# Patient Record
Sex: FEMALE | Race: BLACK OR AFRICAN AMERICAN | Employment: FULL TIME | URBAN - METROPOLITAN AREA
[De-identification: names, ages, dates, MRNs, and addresses within clinical notes are randomized per-mention and may not be internally consistent; named-entity substitution may affect disease eponyms.]

---

## 2022-12-28 ENCOUNTER — HOSPITAL ENCOUNTER (OUTPATIENT)
Age: 27
Discharge: HOME | End: 2022-12-28
Attending: OBSTETRICS & GYNECOLOGY | Admitting: OBSTETRICS & GYNECOLOGY
Payer: COMMERCIAL

## 2022-12-28 VITALS
BODY MASS INDEX: 38.51 KG/M2 | RESPIRATION RATE: 17 BRPM | TEMPERATURE: 99.4 F | WEIGHT: 191 LBS | HEIGHT: 59 IN | HEART RATE: 91 BPM | DIASTOLIC BLOOD PRESSURE: 93 MMHG | SYSTOLIC BLOOD PRESSURE: 131 MMHG

## 2022-12-28 PROBLEM — O16.9 HYPERTENSION AFFECTING PREGNANCY: Status: ACTIVE | Noted: 2022-12-28

## 2022-12-28 LAB
ALBUMIN SERPL-MCNC: 2.6 G/DL (ref 3.4–5)
ALBUMIN/GLOB SERPL: 0.7 {RATIO} (ref 0.8–1.7)
ALP SERPL-CCNC: 117 U/L (ref 45–117)
ALT SERPL-CCNC: 13 U/L (ref 13–56)
ANION GAP SERPL CALC-SCNC: 9 MMOL/L (ref 3–18)
AST SERPL-CCNC: 13 U/L (ref 10–38)
BILIRUB SERPL-MCNC: 0.4 MG/DL (ref 0.2–1)
BUN SERPL-MCNC: 7 MG/DL (ref 7–18)
BUN/CREAT SERPL: 10 (ref 12–20)
CALCIUM SERPL-MCNC: 8.6 MG/DL (ref 8.5–10.1)
CHLORIDE SERPL-SCNC: 105 MMOL/L (ref 100–111)
CO2 SERPL-SCNC: 23 MMOL/L (ref 21–32)
CREAT SERPL-MCNC: 0.73 MG/DL (ref 0.6–1.3)
CREAT UR-MCNC: 138 MG/DL (ref 30–125)
ERYTHROCYTE [DISTWIDTH] IN BLOOD BY AUTOMATED COUNT: 12.4 % (ref 11.6–14.5)
GLOBULIN SER CALC-MCNC: 3.8 G/DL (ref 2–4)
GLUCOSE SERPL-MCNC: 138 MG/DL (ref 74–99)
HCT VFR BLD AUTO: 32.9 % (ref 35–45)
HGB BLD-MCNC: 11.3 G/DL (ref 12–16)
MCH RBC QN AUTO: 29.7 PG (ref 24–34)
MCHC RBC AUTO-ENTMCNC: 34.3 G/DL (ref 31–37)
MCV RBC AUTO: 86.4 FL (ref 78–100)
NRBC # BLD: 0 K/UL (ref 0–0.01)
NRBC BLD-RTO: 0 PER 100 WBC
PLATELET # BLD AUTO: 234 K/UL (ref 135–420)
PMV BLD AUTO: 8.4 FL (ref 9.2–11.8)
POTASSIUM SERPL-SCNC: 3.4 MMOL/L (ref 3.5–5.5)
PROT SERPL-MCNC: 6.4 G/DL (ref 6.4–8.2)
PROT UR-MCNC: 21 MG/DL
PROT/CREAT UR-RTO: 0.2
RBC # BLD AUTO: 3.81 M/UL (ref 4.2–5.3)
SODIUM SERPL-SCNC: 137 MMOL/L (ref 136–145)
URATE SERPL-MCNC: 4.3 MG/DL (ref 2.6–7.2)
WBC # BLD AUTO: 13.9 K/UL (ref 4.6–13.2)

## 2022-12-28 PROCEDURE — 59025 FETAL NON-STRESS TEST: CPT

## 2022-12-28 PROCEDURE — 99283 EMERGENCY DEPT VISIT LOW MDM: CPT

## 2022-12-28 PROCEDURE — 80053 COMPREHEN METABOLIC PANEL: CPT

## 2022-12-28 PROCEDURE — 84550 ASSAY OF BLOOD/URIC ACID: CPT

## 2022-12-28 PROCEDURE — 85027 COMPLETE CBC AUTOMATED: CPT

## 2022-12-28 PROCEDURE — 84156 ASSAY OF PROTEIN URINE: CPT

## 2022-12-28 PROCEDURE — 74011250637 HC RX REV CODE- 250/637

## 2022-12-28 RX ORDER — LABETALOL 200 MG/1
200 TABLET, FILM COATED ORAL 2 TIMES DAILY
Qty: 60 TABLET | Refills: 2 | Status: SHIPPED | OUTPATIENT
Start: 2022-12-28

## 2022-12-28 RX ORDER — LABETALOL 100 MG/1
100 TABLET, FILM COATED ORAL EVERY 12 HOURS
Status: DISCONTINUED | OUTPATIENT
Start: 2022-12-28 | End: 2022-12-28 | Stop reason: HOSPADM

## 2022-12-28 RX ORDER — LABETALOL 100 MG/1
100 TABLET, FILM COATED ORAL
Status: COMPLETED | OUTPATIENT
Start: 2022-12-28 | End: 2022-12-28

## 2022-12-28 RX ORDER — ONDANSETRON HYDROCHLORIDE 8 MG/1
8 TABLET, FILM COATED ORAL
COMMUNITY

## 2022-12-28 RX ADMIN — LABETALOL HYDROCHLORIDE 100 MG: 100 TABLET, FILM COATED ORAL at 12:41

## 2022-12-28 RX ADMIN — LABETALOL HYDROCHLORIDE 100 MG: 100 TABLET, FILM COATED ORAL at 11:00

## 2022-12-28 NOTE — PROGRESS NOTES
26  @ 29.1 weeks arrived from office with elevated BP's. Orders received for PIH workup. To bathroom to void, ambulated to bed and connected to EFM. [address] [facility] Jakob LIMON at bedside discussing POC with pt. Pt denies any questions at this time. 1230 Reviewed BP's with Jakob LIMON, will increase dose of labetalol to 200 mg BID.    1243 Discharge instructions given, pt verbalized understanding.

## 2022-12-31 NOTE — PROGRESS NOTES
Pt to labor and delivery from office with elevated blood pressures. Pre - eclampsia labs done and all WNL. GHTN diagnosis confirmed. Started on Labetalol 200 mg PO BID. Pt to have twice weekly NST in office and growth US next week then every 4 weeks. Plan for delivery at 37 weeks d/t GHTN on medications. Reviewed plan of care with pt and all questions answered. Discharge home.       Dayana Guerrero

## 2023-01-02 ENCOUNTER — HOSPITAL ENCOUNTER (EMERGENCY)
Age: 28
Discharge: HOME | End: 2023-01-02
Attending: OBSTETRICS & GYNECOLOGY | Admitting: OBSTETRICS & GYNECOLOGY

## 2023-01-02 VITALS
WEIGHT: 191 LBS | DIASTOLIC BLOOD PRESSURE: 90 MMHG | BODY MASS INDEX: 38.51 KG/M2 | HEART RATE: 91 BPM | SYSTOLIC BLOOD PRESSURE: 134 MMHG | HEIGHT: 59 IN | TEMPERATURE: 99.3 F

## 2023-01-04 ENCOUNTER — HOSPITAL ENCOUNTER (EMERGENCY)
Age: 28
Discharge: HOME | End: 2023-01-04
Attending: OBSTETRICS & GYNECOLOGY | Admitting: OBSTETRICS & GYNECOLOGY
Payer: MEDICAID

## 2023-01-04 VITALS
RESPIRATION RATE: 17 BRPM | HEIGHT: 59 IN | HEART RATE: 123 BPM | BODY MASS INDEX: 38.51 KG/M2 | WEIGHT: 191 LBS | DIASTOLIC BLOOD PRESSURE: 89 MMHG | TEMPERATURE: 99.4 F | SYSTOLIC BLOOD PRESSURE: 130 MMHG

## 2023-01-04 LAB
COVID-19 RAPID TEST, COVR: NOT DETECTED
FLUAV AG NPH QL IA: POSITIVE
FLUBV AG NOSE QL IA: NEGATIVE
SOURCE, COVRS: NORMAL

## 2023-01-04 PROCEDURE — 59025 FETAL NON-STRESS TEST: CPT

## 2023-01-04 PROCEDURE — 87804 INFLUENZA ASSAY W/OPTIC: CPT

## 2023-01-04 PROCEDURE — 87635 SARS-COV-2 COVID-19 AMP PRB: CPT

## 2023-01-04 PROCEDURE — 96365 THER/PROPH/DIAG IV INF INIT: CPT

## 2023-01-04 NOTE — PROGRESS NOTES
RN at bedside, patient consumed two 4oz orange juices. Patient requesting to use restroom, RN unplugged EFM and TOCO, patient up to restroom.

## 2023-01-04 NOTE — PROGRESS NOTES
1420- RN and Sav Reid CNM reviewed fetal tracing, plan to discharge patient to home. 1425-RN At bedside fluid bolus completed IV removed and band-aide applied    1426-EFM and TOCO removed patient getting dressed.

## 2023-01-04 NOTE — PROGRESS NOTES
Presented to L&D reporting fever x3d, congestion, and malaise. Reports good FM, denies VB, LOF, contractions. Influenza and COVID swab obtained. Flu positive. Patient declines IV fluids. Cheyanne HuttonPATEL, CNM  2023               [facility] [place]   PATIENT INFORMATION      CSN:  [id number]                             Patient Name: Juliocesar Echols Pt ID: [id number]   Address: [place] [address] [place] [address]   Sex: Female  Mar Stat: SINGLE   : 1995 Age:   24 yrs   Home Phone: [phone] Mobile Phone:   [phone]   Work Phone:   Employer:    [employer]   Race: BLACK/  Yarsani:   Orthodox    ADMISSION INFORMATION   Adm Date: 2023 Adm Time: 1118   Patient Class: Ld triage outpatient Service: Obstetrics   Adm Source: Non-health care facility* Adm Type: URGENT   Admitting Prov: Paul Garcia Attending Prov: Paul Garcia   Unit: [facility] 2east Ld Triage Room/Bed: [facility]/    Adm Diagnosis: pregnancy                                                     Disch Date:   Disch Time:     GUARANTOR INFORMATION   Name:  Juliocesar Echols Phone: [phone] Rel :  Self   Address: [place] [id number] [address], [address]   Name:     Rel:  Agustin Méndez 117 Ph:  Work Ph:  Mobile Ph: [facility] [phone]      [address] Ph.  Mobile phone [3]   COVERAGE INFORMATION   Primary Ins:    [facility] Insured Name: Brigid Alcantar, [address] [place] [address] Name: [place] [address] [place] [address],[address] Address: [place] [address] [address] Rel to Patient: Self      Sex: Female      Policy #:  [id number]    Group # [id number] Group Name:       Auth #: Auth number: N/A Ins Phone:     Secondary Ins: [address] Name: [facility] Address: [address] [place] [address] Rel to Patient: Child      Sex: Female      Policy #: [id number]    Group #:   Group Name:     Gregor King #: N/A Ins Phone: Accident Date:    Accident Type:     PROVIDER INFORMATION   PCP:         None PCP Phone:  None   Referring Prov:   No ref.  provider found Referring Phone:  N/A   Advanced Directive:  Not Received Language:   ENGLISH

## 2023-01-04 NOTE — PROGRESS NOTES
RN at bedside with discharge paperwork. Patient educated on reasons to return to hospital and verbalized understanding. At this time patient has no further concerns or questions.  Patient ambulating alone out of hospital.

## 2023-01-04 NOTE — PROGRESS NOTES
RN at bedside   Per CNM insert IV and run 1L fluid bolus. IV insert, fluid bolus started, patient resting comfortably in bed.  No questions or concerns

## 2023-01-09 ENCOUNTER — HOSPITAL ENCOUNTER (OUTPATIENT)
Age: 28
Discharge: HOME | End: 2023-01-09
Attending: OBSTETRICS & GYNECOLOGY | Admitting: OBSTETRICS & GYNECOLOGY
Payer: COMMERCIAL

## 2023-01-09 VITALS
BODY MASS INDEX: 39.31 KG/M2 | SYSTOLIC BLOOD PRESSURE: 138 MMHG | HEART RATE: 76 BPM | WEIGHT: 195 LBS | RESPIRATION RATE: 16 BRPM | DIASTOLIC BLOOD PRESSURE: 86 MMHG | HEIGHT: 59 IN | TEMPERATURE: 98.9 F

## 2023-01-09 PROBLEM — Z34.90 PREGNANCY: Status: ACTIVE | Noted: 2023-01-09

## 2023-01-09 LAB
APPEARANCE UR: CLEAR
BILIRUB UR QL: NEGATIVE
COLOR UR: YELLOW
GLUCOSE UR QL STRIP.AUTO: NEGATIVE MG/DL
KETONES UR-MCNC: NEGATIVE MG/DL
LEUKOCYTE ESTERASE UR QL STRIP: NEGATIVE
NITRITE UR QL: NEGATIVE
PH UR: 6 (ref 5–9)
PROT UR QL: NEGATIVE MG/DL
RBC # UR STRIP: NEGATIVE
SERVICE CMNT-IMP: NORMAL
SP GR UR: 1.01 (ref 1–1.02)
UROBILINOGEN UR QL: 0.2 EU/DL (ref 0.2–1)

## 2023-01-09 PROCEDURE — 59025 FETAL NON-STRESS TEST: CPT

## 2023-01-09 PROCEDURE — 81003 URINALYSIS AUTO W/O SCOPE: CPT

## 2023-01-09 NOTE — PROGRESS NOTES
1350- Patient presents to labor and delivery  30weeks 6days for complaints of decreased fetal movement since Saturday. Patient was seen today in the office, states she had elevated BP in office. Patient is gestational HTN on labetalol BID. Triage assessment performed, TOCO and ultrasound placed on abdomin, abdomin soft to palpate. PO fluids given to patient. [id number]- Patient feels movement    1435- M.  Margo Zafar made aware of patient, orders given     [id number] 34-  Discharge instructions given to patient, patient verbalized understanding

## 2023-01-09 NOTE — DISCHARGE INSTRUCTIONS
Prenatal Discharge Instructions  Follow up appointment: Ensure to keep your scheduled appointment with your OB     Diet: As toleated, ensure to drink plenty of fluids especially water for hydration     Activity: As tolerated, elevate feet and legs while sitting to help keep swelling to a minimum     Medications:  As prescribed     Call your physician or return to Labor and Delivery if any of the following symptoms occur:  Signs of labor (contractions every 5-10 minutes for 1 hour)  Vaginal bleeding  Rupture of amniotic membranes (water breaks)  Fever  Decreased fetal movement (less than 5-10 movements in 1 hour)

## 2023-01-18 ENCOUNTER — HOSPITAL ENCOUNTER (OUTPATIENT)
Age: 28
Discharge: HOME | End: 2023-01-19
Attending: OBSTETRICS & GYNECOLOGY | Admitting: STUDENT IN AN ORGANIZED HEALTH CARE EDUCATION/TRAINING PROGRAM
Payer: MEDICAID

## 2023-01-18 VITALS
WEIGHT: 199 LBS | SYSTOLIC BLOOD PRESSURE: 139 MMHG | RESPIRATION RATE: 18 BRPM | BODY MASS INDEX: 40.12 KG/M2 | HEART RATE: 88 BPM | DIASTOLIC BLOOD PRESSURE: 87 MMHG | HEIGHT: 59 IN

## 2023-01-18 PROCEDURE — 99282 EMERGENCY DEPT VISIT SF MDM: CPT

## 2023-01-19 NOTE — PROGRESS NOTES
2100: Pt arrived to unit for c/o decreased fetal movement. 2107: Pt placed on EFM. Reassuring FHR noted. 2145: Strip reviewed pt is feeling movement. She also has a prenatal appointment tomorrow morning in the office at 0830  2248: removed from EFM. 2215: Verbal and written discharge instructions provided pt verbalizes understanding and discharged from unit at this time.

## 2023-01-20 NOTE — PROGRESS NOTES
Pt to L&D c/o decreased fetal movement at 32/27 weeks gestation. Denies ctx's, VB or LOF. NST RGA. Pt aware of movement after NST complete. Discharge home with precautions. Has appt with office tomorrow for NST. Reza Robert CNM   DX:  Decreased Fetal Movement. [facility] [place]   PATIENT INFORMATION      CSN:  [id number]                             Patient Name: Giuliana Hernandez Pt ID: [id number]   Address: [place] [id number] [facility] [place]   [id number]   Sex: Female  Mar Stat: SINGLE   : 1995 Age:   24 yrs   Home Phone: [phone] Mobile Phone:   [phone]   Work Phone:   Employer: [employer]   Race: BLACK/  Mandaen:   Gnosticism    ADMISSION INFORMATION   Adm Date: 2023 Adm Time:    Patient Class:  Outpatient Service: Obstetrics   Adm Source: Non-health care facility* Adm Type: URGENT   Admitting Prov: Pablo Monteiro Attending Prov: Amarjit Ordonez   Unit: [facility] 2east Ld Triage Room/Bed: [place] [address]    Adm Diagnosis:                                                      Disch Date: 2023 Disch Time: [address] INFORMATION   Name:  Giuliana Hernandez Phone: [phone] Rel :  Self   Address: [place] [address] [address] [facility] [address] [place] [address], [address]   Name:     Rel:  [place] [address] Ph:  Work Ph:  Mobile Ph: [facility] [phone]      [address] Ph.  Mobile phone [3]   COVERAGE INFORMATION   Primary Ins:    [facility] Insured Name: Roy Phoenix, [address] [facility] Name: [address] Address: [address] Rel to Patient: Self      Sex: Female      Policy #:  [id number]    Group # [id number] Group Name:       Satish Michelle #: Auth number: N/A Ins Phone:     Secondary Ins: Tresa Castillo Insured Name: Giuliana Hernandez   Claim Address: [address] [place] [address] Rel to Patient: Self      Sex: Female      Policy #: [id number]    Group #: [id number] Group Name:     Robert Garibay #: N/A Ins Phone:     Accident Date:    Accident Type:     PROVIDER INFORMATION   PCP:         None PCP Phone:  None   Referring Prov:   No ref.  provider found Referring Phone:  N/A   Advanced Directive:  Not Received Language:   ENGLISH

## 2023-01-26 ENCOUNTER — HOSPITAL ENCOUNTER (OUTPATIENT)
Age: 28
Discharge: HOME | End: 2023-01-26
Attending: OBSTETRICS & GYNECOLOGY | Admitting: OBSTETRICS & GYNECOLOGY
Payer: COMMERCIAL

## 2023-01-26 VITALS
DIASTOLIC BLOOD PRESSURE: 77 MMHG | SYSTOLIC BLOOD PRESSURE: 137 MMHG | BODY MASS INDEX: 40.92 KG/M2 | WEIGHT: 203 LBS | HEIGHT: 59 IN | TEMPERATURE: 98.4 F | HEART RATE: 82 BPM

## 2023-01-26 PROBLEM — O28.8 NST (NON-STRESS TEST) NONREACTIVE: Status: ACTIVE | Noted: 2023-01-26

## 2023-01-26 LAB
ALBUMIN SERPL-MCNC: 2.5 G/DL (ref 3.4–5)
ALBUMIN/GLOB SERPL: 0.6 (ref 0.8–1.7)
ALP SERPL-CCNC: 137 U/L (ref 45–117)
ALT SERPL-CCNC: 19 U/L (ref 13–56)
ANION GAP SERPL CALC-SCNC: 8 MMOL/L (ref 3–18)
AST SERPL-CCNC: 24 U/L (ref 10–38)
BILIRUB SERPL-MCNC: 0.2 MG/DL (ref 0.2–1)
BUN SERPL-MCNC: 9 MG/DL (ref 7–18)
BUN/CREAT SERPL: 14 (ref 12–20)
CALCIUM SERPL-MCNC: 9 MG/DL (ref 8.5–10.1)
CHLORIDE SERPL-SCNC: 103 MMOL/L (ref 100–111)
CO2 SERPL-SCNC: 26 MMOL/L (ref 21–32)
CREAT SERPL-MCNC: 0.65 MG/DL (ref 0.6–1.3)
CREAT UR-MCNC: 64 MG/DL (ref 30–125)
ERYTHROCYTE [DISTWIDTH] IN BLOOD BY AUTOMATED COUNT: 13.2 % (ref 11.6–14.5)
GLOBULIN SER CALC-MCNC: 4.1 G/DL (ref 2–4)
GLUCOSE SERPL-MCNC: 89 MG/DL (ref 74–99)
HCT VFR BLD AUTO: 33.4 % (ref 35–45)
HGB BLD-MCNC: 11.7 G/DL (ref 12–16)
LDH SERPL L TO P-CCNC: 230 U/L (ref 81–234)
MCH RBC QN AUTO: 30.5 PG (ref 24–34)
MCHC RBC AUTO-ENTMCNC: 35 G/DL (ref 31–37)
MCV RBC AUTO: 87.2 FL (ref 78–100)
NRBC # BLD: 0 K/UL (ref 0–0.01)
NRBC BLD-RTO: 0 PER 100 WBC
PLATELET # BLD AUTO: 235 K/UL (ref 135–420)
PMV BLD AUTO: 9.5 FL (ref 9.2–11.8)
POTASSIUM SERPL-SCNC: 3.8 MMOL/L (ref 3.5–5.5)
PROT SERPL-MCNC: 6.6 G/DL (ref 6.4–8.2)
PROT UR-MCNC: 13 MG/DL
PROT/CREAT UR-RTO: 0.2
RBC # BLD AUTO: 3.83 M/UL (ref 4.2–5.3)
SODIUM SERPL-SCNC: 137 MMOL/L (ref 136–145)
URATE SERPL-MCNC: 5.1 MG/DL (ref 2.6–7.2)
WBC # BLD AUTO: 13.3 K/UL (ref 4.6–13.2)

## 2023-01-26 PROCEDURE — 80053 COMPREHEN METABOLIC PANEL: CPT

## 2023-01-26 PROCEDURE — 84156 ASSAY OF PROTEIN URINE: CPT

## 2023-01-26 PROCEDURE — 99283 EMERGENCY DEPT VISIT LOW MDM: CPT

## 2023-01-26 PROCEDURE — 74011250636 HC RX REV CODE- 250/636

## 2023-01-26 PROCEDURE — 74011250637 HC RX REV CODE- 250/637

## 2023-01-26 PROCEDURE — 84550 ASSAY OF BLOOD/URIC ACID: CPT

## 2023-01-26 PROCEDURE — 85027 COMPLETE CBC AUTOMATED: CPT

## 2023-01-26 PROCEDURE — 83615 LACTATE (LD) (LDH) ENZYME: CPT

## 2023-01-26 PROCEDURE — 96372 THER/PROPH/DIAG INJ SC/IM: CPT

## 2023-01-26 PROCEDURE — 59025 FETAL NON-STRESS TEST: CPT

## 2023-01-26 RX ORDER — LABETALOL 200 MG/1
200 TABLET, FILM COATED ORAL
Status: COMPLETED | OUTPATIENT
Start: 2023-01-26 | End: 2023-01-26

## 2023-01-26 RX ORDER — BETAMETHASONE SODIUM PHOSPHATE AND BETAMETHASONE ACETATE 3; 3 MG/ML; MG/ML
12 INJECTION, SUSPENSION INTRA-ARTICULAR; INTRALESIONAL; INTRAMUSCULAR; SOFT TISSUE EVERY 24 HOURS
Status: DISCONTINUED | OUTPATIENT
Start: 2023-01-26 | End: 2023-01-26 | Stop reason: HOSPADM

## 2023-01-26 RX ADMIN — LABETALOL HYDROCHLORIDE 200 MG: 200 TABLET, FILM COATED ORAL at 13:47

## 2023-01-26 RX ADMIN — BETAMETHASONE SODIUM PHOSPHATE AND BETAMETHASONE ACETATE 12 MG: 3; 3 INJECTION, SUSPENSION INTRA-ARTICULAR; INTRALESIONAL; INTRAMUSCULAR at 15:40

## 2023-01-26 RX ADMIN — SODIUM CHLORIDE, POTASSIUM CHLORIDE, SODIUM LACTATE AND CALCIUM CHLORIDE 1000 ML: 600; 310; 30; 20 INJECTION, SOLUTION INTRAVENOUS at 13:16

## 2023-01-26 NOTE — PROGRESS NOTES
1545 Patient discharged in stable condition. Discharged education reviewed and packet given to patient. Patient verbalizes understanding of discharge instructions. E-sign completed. Armbands removed and given to patient. No further needs or questions reported at this time.

## 2023-01-26 NOTE — DISCHARGE INSTRUCTIONS
Pregnancy Precautions: Care Instructions  Your Care Instructions     There is no sure way to prevent labor before your due date ( labor) or to prevent most other pregnancy problems. But there are things you can do to increase your chances of a healthy pregnancy. Go to your appointments, follow your doctor's advice, and take good care of yourself. Eat well, and exercise (if your doctor agrees). And make sure to drink plenty of water. Follow-up care is a key part of your treatment and safety. Be sure to make and go to all appointments, and call your doctor if you are having problems. It's also a good idea to know your test results and keep a list of the medicines you take. How can you care for yourself at home? Make sure you go to your prenatal appointments. At each visit, your doctor will check your blood pressure. Your doctor will also check to see if you have protein in your urine. High blood pressure and protein in urine are signs of preeclampsia. This condition can be dangerous for you and your baby. Drink plenty of fluids. Dehydration can cause contractions. If you have kidney, heart, or liver disease and have to limit fluids, talk with your doctor before you increase the amount of fluids you drink. Tell your doctor right away if you notice any symptoms of an infection, such as:  Burning when you urinate. A foul-smelling discharge from your vagina. Vaginal itching. Unexplained fever. Unusual pain or soreness in your uterus or lower belly. Eat a balanced diet. Include plenty of foods that are high in calcium and iron. Foods high in calcium include milk, cheese, yogurt, almonds, and broccoli. Foods high in iron include red meat, shellfish, poultry, eggs, beans, raisins, whole-grain bread, and leafy green vegetables. Do not smoke. If you need help quitting, talk to your doctor about stop-smoking programs and medicines. These can increase your chances of quitting for good.   Do not drink alcohol or use marijuana or illegal drugs. Follow your doctor's directions about activity. Your doctor will let you know how much, if any, exercise you can do. Ask your doctor if you can have sex. If you are at risk for early labor, your doctor may ask you to not have sex. Take care to prevent falls. During pregnancy, your joints are loose, and your balance is off. Sports such as bicycling, skiing, or in-line skating can increase your risk of falling. And don't ride horses or motorcycles, dive, water ski, scuba dive, or parachute jump while you are pregnant. Avoid things that can make your body too hot and may be harmful to your baby, such as a hot tub or sauna. Or talk with your doctor before doing anything that raises your body temperature. Your doctor can tell you if it's safe. Do not take any over-the-counter or herbal medicines or supplements without talking to your doctor or pharmacist first.  When should you call for help? Call 911  anytime you think you may need emergency care. For example, call if:    You passed out (lost consciousness). You have a seizure. You have severe vaginal bleeding. You have severe pain in your belly or pelvis. You have had fluid gushing or leaking from your vagina and you know or think the umbilical cord is bulging into your vagina. If this happens, immediately get down on your knees so your rear end (buttocks) is higher than your head. This will decrease the pressure on the cord until help arrives. Call your doctor now or seek immediate medical care if:    You have signs of preeclampsia, such as:  Sudden swelling of your face, hands, or feet. New vision problems (such as dimness, blurring, or seeing spots). A severe headache. You have any vaginal bleeding. You have belly pain or cramping. You have a fever. You have had regular contractions (with or without pain) for an hour.  This means that you have 8 or more within 1 hour or 4 or more in 20 minutes after you change your position and drink fluids. You have a sudden release of fluid from your vagina. You have low back pain or pelvic pressure that does not go away. You notice that your baby has stopped moving or is moving much less than normal.   Watch closely for changes in your health, and be sure to contact your doctor if you have any problems. Where can you learn more? Go to http://www.madrigal.com/  Enter Y951 in the search box to learn more about \"Pregnancy Precautions: Care Instructions. \"  Current as of: February 23, 2022               Content Version: 13.4  © 3479-8363 [employer]. Care instructions adapted under license by [employer] (which disclaims liability or warranty for this information). If you have questions about a medical condition or this instruction, always ask your healthcare professional. [address] any warranty or liability for your use of this information. Weeks 32 to 34 of Your Pregnancy: Care Instructions  Decide whether you want to bank or donate your baby's umbilical cord blood. If you want to save this blood, you have to arrange for it ahead of time. Decide about circumcision. Personal, Buddhism, or cultural beliefs may play a role in your decision. You get to decide what you want for your baby. Learn how to ease hemorrhoids. Get more liquids, fruits, vegetables, and fiber in your diet. Avoid sitting for too long. Clean yourself with moist toilet paper. Or try witch hazel pads. Try ice packs or warm sitz baths for discomfort. Use hydrocortisone cream for pain or itching. Ask your doctor about stool softeners. Consider the benefits of breastfeeding. It reduces your baby's risk of sudden infant death syndrome (SIDS).  babies are less likely to get certain infections. And they're less likely to be obese or get diabetes later in life.   It can lower your risk of breast and ovarian cancers and osteoporosis. It saves you money. Follow-up care is a key part of your treatment and safety. Be sure to make and go to all appointments, and call your doctor if you are having problems. It's also a good idea to know your test results and keep a list of the medicines you take. Where can you learn more? Go to http://www.madrigal.com/  Enter X711 in the search box to learn more about \"Weeks 32 to 34 of Your Pregnancy: Care Instructions. \"  Current as of: February 23, 2022               Content Version: 13.4  © 3107-2130 [employer]. Care instructions adapted under license by [employer] (which disclaims liability or warranty for this information). If you have questions about a medical condition or this instruction, always ask your healthcare professional. [address] any warranty or liability for your use of this information.

## 2023-01-27 ENCOUNTER — HOSPITAL ENCOUNTER (OUTPATIENT)
Age: 28
Discharge: HOME | End: 2023-01-27
Attending: OBSTETRICS & GYNECOLOGY | Admitting: STUDENT IN AN ORGANIZED HEALTH CARE EDUCATION/TRAINING PROGRAM
Payer: COMMERCIAL

## 2023-01-27 VITALS
HEIGHT: 59 IN | TEMPERATURE: 98.2 F | OXYGEN SATURATION: 100 % | SYSTOLIC BLOOD PRESSURE: 137 MMHG | WEIGHT: 203 LBS | DIASTOLIC BLOOD PRESSURE: 88 MMHG | HEART RATE: 88 BPM | BODY MASS INDEX: 40.92 KG/M2 | RESPIRATION RATE: 17 BRPM

## 2023-01-27 PROBLEM — Z3A.33 33 WEEKS GESTATION OF PREGNANCY: Status: ACTIVE | Noted: 2023-01-27

## 2023-01-27 PROCEDURE — 59025 FETAL NON-STRESS TEST: CPT

## 2023-01-27 PROCEDURE — 99281 EMR DPT VST MAYX REQ PHY/QHP: CPT

## 2023-01-27 PROCEDURE — 74011250636 HC RX REV CODE- 250/636: Performed by: MIDWIFE

## 2023-01-27 PROCEDURE — 96372 THER/PROPH/DIAG INJ SC/IM: CPT

## 2023-01-27 RX ORDER — BETAMETHASONE SODIUM PHOSPHATE AND BETAMETHASONE ACETATE 3; 3 MG/ML; MG/ML
12 INJECTION, SUSPENSION INTRA-ARTICULAR; INTRALESIONAL; INTRAMUSCULAR; SOFT TISSUE EVERY 24 HOURS
Status: COMPLETED | OUTPATIENT
Start: 2023-01-27 | End: 2023-01-27

## 2023-01-27 RX ADMIN — BETAMETHASONE SODIUM PHOSPHATE AND BETAMETHASONE ACETATE 12 MG: 3; 3 INJECTION, SUSPENSION INTRA-ARTICULAR; INTRALESIONAL; INTRAMUSCULAR at 16:15

## 2023-01-27 NOTE — DISCHARGE INSTRUCTIONS
Pregnancy Precautions: Care Instructions  Your Care Instructions     There is no sure way to prevent labor before your due date ( labor) or to prevent most other pregnancy problems. But there are things you can do to increase your chances of a healthy pregnancy. Go to your appointments, follow your doctor's advice, and take good care of yourself. Eat well, and exercise (if your doctor agrees). And make sure to drink plenty of water. Follow-up care is a key part of your treatment and safety. Be sure to make and go to all appointments, and call your doctor if you are having problems. It's also a good idea to know your test results and keep a list of the medicines you take. How can you care for yourself at home? Make sure you go to your prenatal appointments. At each visit, your doctor will check your blood pressure. Your doctor will also check to see if you have protein in your urine. High blood pressure and protein in urine are signs of preeclampsia. This condition can be dangerous for you and your baby. Drink plenty of fluids. Dehydration can cause contractions. If you have kidney, heart, or liver disease and have to limit fluids, talk with your doctor before you increase the amount of fluids you drink. Tell your doctor right away if you notice any symptoms of an infection, such as:  Burning when you urinate. A foul-smelling discharge from your vagina. Vaginal itching. Unexplained fever. Unusual pain or soreness in your uterus or lower belly. Eat a balanced diet. Include plenty of foods that are high in calcium and iron. Foods high in calcium include milk, cheese, yogurt, almonds, and broccoli. Foods high in iron include red meat, shellfish, poultry, eggs, beans, raisins, whole-grain bread, and leafy green vegetables. Do not smoke. If you need help quitting, talk to your doctor about stop-smoking programs and medicines. These can increase your chances of quitting for good.   Do not drink alcohol or use marijuana or illegal drugs. Follow your doctor's directions about activity. Your doctor will let you know how much, if any, exercise you can do. Ask your doctor if you can have sex. If you are at risk for early labor, your doctor may ask you to not have sex. Take care to prevent falls. During pregnancy, your joints are loose, and your balance is off. Sports such as bicycling, skiing, or in-line skating can increase your risk of falling. And don't ride horses or motorcycles, dive, water ski, scuba dive, or parachute jump while you are pregnant. Avoid things that can make your body too hot and may be harmful to your baby, such as a hot tub or sauna. Or talk with your doctor before doing anything that raises your body temperature. Your doctor can tell you if it's safe. Do not take any over-the-counter or herbal medicines or supplements without talking to your doctor or pharmacist first.  When should you call for help? Call 911  anytime you think you may need emergency care. For example, call if:    You passed out (lost consciousness). You have a seizure. You have severe vaginal bleeding. You have severe pain in your belly or pelvis. You have had fluid gushing or leaking from your vagina and you know or think the umbilical cord is bulging into your vagina. If this happens, immediately get down on your knees so your rear end (buttocks) is higher than your head. This will decrease the pressure on the cord until help arrives. Call your doctor now or seek immediate medical care if:    You have signs of preeclampsia, such as:  Sudden swelling of your face, hands, or feet. New vision problems (such as dimness, blurring, or seeing spots). A severe headache. You have any vaginal bleeding. You have belly pain or cramping. You have a fever. You have had regular contractions (with or without pain) for an hour.  This means that you have 8 or more within 1 hour or 4 or more in 20 minutes after you change your position and drink fluids. You have a sudden release of fluid from your vagina. You have low back pain or pelvic pressure that does not go away. You notice that your baby has stopped moving or is moving much less than normal.   Watch closely for changes in your health, and be sure to contact your doctor if you have any problems. Where can you learn more? Go to http://www.madrigal.com/  Enter Y951 in the search box to learn more about \"Pregnancy Precautions: Care Instructions. \"  Current as of: February 23, 2022               Content Version: 13.4  © 2736-7813 [employer]. Care instructions adapted under license by [employer] (which disclaims liability or warranty for this information). If you have questions about a medical condition or this instruction, always ask your healthcare professional. [address] any warranty or liability for your use of this information. Weeks 32 to 34 of Your Pregnancy: Care Instructions  Decide whether you want to bank or donate your baby's umbilical cord blood. If you want to save this blood, you have to arrange for it ahead of time. Decide about circumcision. Personal, Voodoo, or cultural beliefs may play a role in your decision. You get to decide what you want for your baby. Learn how to ease hemorrhoids. Get more liquids, fruits, vegetables, and fiber in your diet. Avoid sitting for too long. Clean yourself with moist toilet paper. Or try witch hazel pads. Try ice packs or warm sitz baths for discomfort. Use hydrocortisone cream for pain or itching. Ask your doctor about stool softeners. Consider the benefits of breastfeeding. It reduces your baby's risk of sudden infant death syndrome (SIDS).  babies are less likely to get certain infections. And they're less likely to be obese or get diabetes later in life.   It can lower your risk of breast and ovarian cancers and osteoporosis. It saves you money. Follow-up care is a key part of your treatment and safety. Be sure to make and go to all appointments, and call your doctor if you are having problems. It's also a good idea to know your test results and keep a list of the medicines you take. Where can you learn more? Go to http://www.madrigal.com/  Enter X711 in the search box to learn more about \"Weeks 32 to 34 of Your Pregnancy: Care Instructions. \"  Current as of: February 23, 2022               Content Version: 13.4  © 7183-4705 [employer]. Care instructions adapted under license by [employer] (which disclaims liability or warranty for this information). If you have questions about a medical condition or this instruction, always ask your healthcare professional. [address] any warranty or liability for your use of this information.

## 2023-01-27 NOTE — ROUTINE PROCESS
[address] Pt ambulated to the unit for NST and second dose of steroid shot. Pt denies headache, blurry vision, chest pain or swelling. Pt taken to overflow triage, oriented to the room and call light. 1702 Pt taken to Triage 3, oriented to the room and call light. US and TOCO adjusted. Call bell within reach. 1745 Patient discharged stable condition. Discharged education reviewed and packet given to patient. Patient verbalizes understanding of discharge instructions. E-sign completed. Armbands removed and given to patient. No further needs or questions reported at this time. 1755 Pt ambulating off the unit.

## 2023-01-27 NOTE — PROGRESS NOTES
Pt sent from office for non reactive NST at 33 3/7 weeks gestation with GHTN on labetalol 200 mg BID. NST performed and initially NR. BPP done 8/8 (LIUDMILA 9.83) MVP 3.34. IV hydration then NST - RGA. Pre e labs done , WNL, PCR 0.2, ALT/AST WNL. BP elevated during visit 150-160/100's. Increased PO labetalol to 200 TID, dose given in L&D and BP came down to 120-130/70's. Denies HA, epigastric pain, visual disturbances or edema. Betamethasone x 1 given. Pt to return in 24 hours for BP check, NST and 2nd betamethasone.   Consulted with Dr. Wesley Kennedy and agrees with Landon 18 JOSE   January 26, 2023

## 2023-02-06 ENCOUNTER — HOSPITAL ENCOUNTER (INPATIENT)
Age: 28
LOS: 4 days | Discharge: HOME | End: 2023-02-10
Attending: OBSTETRICS & GYNECOLOGY | Admitting: OBSTETRICS & GYNECOLOGY
Payer: COMMERCIAL

## 2023-02-06 PROBLEM — Z34.93 NORMAL IUP (INTRAUTERINE PREGNANCY) ON PRENATAL ULTRASOUND, THIRD TRIMESTER: Status: ACTIVE | Noted: 2023-02-06

## 2023-02-06 PROBLEM — O14.13 PREECLAMPSIA, SEVERE, THIRD TRIMESTER: Status: ACTIVE | Noted: 2023-02-06

## 2023-02-06 PROBLEM — Z3A.34 34 WEEKS GESTATION OF PREGNANCY: Status: ACTIVE | Noted: 2023-02-06

## 2023-02-06 PROBLEM — Z33.1 IUP (INTRAUTERINE PREGNANCY), INCIDENTAL: Status: ACTIVE | Noted: 2023-02-06

## 2023-02-06 PROBLEM — O13.3 GESTATIONAL HYPERTENSION, THIRD TRIMESTER: Status: ACTIVE | Noted: 2023-02-06

## 2023-02-06 LAB
ALBUMIN SERPL-MCNC: 2.5 G/DL (ref 3.4–5)
ALBUMIN/GLOB SERPL: 0.7 (ref 0.8–1.7)
ALP SERPL-CCNC: 144 U/L (ref 45–117)
ALT SERPL-CCNC: 20 U/L (ref 13–56)
ANION GAP SERPL CALC-SCNC: 3 MMOL/L (ref 3–18)
APPEARANCE UR: CLEAR
AST SERPL-CCNC: 20 U/L (ref 10–38)
BILIRUB SERPL-MCNC: 0.2 MG/DL (ref 0.2–1)
BILIRUB UR QL: NEGATIVE
BUN SERPL-MCNC: 11 MG/DL (ref 7–18)
BUN/CREAT SERPL: 19 (ref 12–20)
CALCIUM SERPL-MCNC: 9.2 MG/DL (ref 8.5–10.1)
CHLORIDE SERPL-SCNC: 107 MMOL/L (ref 100–111)
CO2 SERPL-SCNC: 28 MMOL/L (ref 21–32)
COLOR UR: YELLOW
CREAT SERPL-MCNC: 0.59 MG/DL (ref 0.6–1.3)
CREAT UR-MCNC: 52 MG/DL (ref 30–125)
ERYTHROCYTE [DISTWIDTH] IN BLOOD BY AUTOMATED COUNT: 13.2 % (ref 11.6–14.5)
GLOBULIN SER CALC-MCNC: 3.6 G/DL (ref 2–4)
GLUCOSE SERPL-MCNC: 77 MG/DL (ref 74–99)
GLUCOSE UR QL STRIP.AUTO: NEGATIVE MG/DL
HCT VFR BLD AUTO: 33.1 % (ref 35–45)
HGB BLD-MCNC: 11.7 G/DL (ref 12–16)
KETONES UR-MCNC: NEGATIVE MG/DL
LDH SERPL L TO P-CCNC: 216 U/L (ref 81–234)
LEUKOCYTE ESTERASE UR QL STRIP: NEGATIVE
MAGNESIUM SERPL-MCNC: 4.7 MG/DL (ref 1.6–2.6)
MCH RBC QN AUTO: 30.6 PG (ref 24–34)
MCHC RBC AUTO-ENTMCNC: 35.3 G/DL (ref 31–37)
MCV RBC AUTO: 86.6 FL (ref 78–100)
NITRITE UR QL: NEGATIVE
NRBC # BLD: 0 K/UL (ref 0–0.01)
NRBC BLD-RTO: 0 PER 100 WBC
PH UR: 6 (ref 5–9)
PLATELET # BLD AUTO: 230 K/UL (ref 135–420)
PMV BLD AUTO: 9.7 FL (ref 9.2–11.8)
POTASSIUM SERPL-SCNC: 4.4 MMOL/L (ref 3.5–5.5)
PROT SERPL-MCNC: 6.1 G/DL (ref 6.4–8.2)
PROT UR QL: ABNORMAL MG/DL
PROT UR-MCNC: 22 MG/DL
PROT/CREAT UR-RTO: 0.4
RBC # BLD AUTO: 3.82 M/UL (ref 4.2–5.3)
RBC # UR STRIP: ABNORMAL
SERVICE CMNT-IMP: ABNORMAL
SODIUM SERPL-SCNC: 138 MMOL/L (ref 136–145)
SP GR UR: 1.01 (ref 1–1.02)
URATE SERPL-MCNC: 5.9 MG/DL (ref 2.6–7.2)
UROBILINOGEN UR QL: 0.2 EU/DL (ref 0.2–1)
WBC # BLD AUTO: 15.6 K/UL (ref 4.6–13.2)

## 2023-02-06 PROCEDURE — 80053 COMPREHEN METABOLIC PANEL: CPT

## 2023-02-06 PROCEDURE — 36415 COLL VENOUS BLD VENIPUNCTURE: CPT

## 2023-02-06 PROCEDURE — 86923 COMPATIBILITY TEST ELECTRIC: CPT

## 2023-02-06 PROCEDURE — 83615 LACTATE (LD) (LDH) ENZYME: CPT

## 2023-02-06 PROCEDURE — 87081 CULTURE SCREEN ONLY: CPT

## 2023-02-06 PROCEDURE — 74011250636 HC RX REV CODE- 250/636

## 2023-02-06 PROCEDURE — 84156 ASSAY OF PROTEIN URINE: CPT

## 2023-02-06 PROCEDURE — 77030028565 HC CATH CERV RIPNG BLN COOK -B

## 2023-02-06 PROCEDURE — 86900 BLOOD TYPING SEROLOGIC ABO: CPT

## 2023-02-06 PROCEDURE — 59200 INSERT CERVICAL DILATOR: CPT

## 2023-02-06 PROCEDURE — 77030040830 HC CATH URETH FOL MDII -A

## 2023-02-06 PROCEDURE — 65270000029 HC RM PRIVATE

## 2023-02-06 PROCEDURE — 74011000250 HC RX REV CODE- 250

## 2023-02-06 PROCEDURE — 81003 URINALYSIS AUTO W/O SCOPE: CPT

## 2023-02-06 PROCEDURE — 75410000002 HC LABOR FEE PER 1 HR

## 2023-02-06 PROCEDURE — 83735 ASSAY OF MAGNESIUM: CPT

## 2023-02-06 PROCEDURE — 74011250637 HC RX REV CODE- 250/637

## 2023-02-06 PROCEDURE — 84550 ASSAY OF BLOOD/URIC ACID: CPT

## 2023-02-06 PROCEDURE — 77030040361 HC SLV COMPR DVT MDII -B

## 2023-02-06 PROCEDURE — 3E033VJ INTRODUCTION OF OTHER HORMONE INTO PERIPHERAL VEIN, PERCUTANEOUS APPROACH: ICD-10-PCS

## 2023-02-06 PROCEDURE — 85027 COMPLETE CBC AUTOMATED: CPT

## 2023-02-06 RX ORDER — SODIUM CHLORIDE 0.9 % (FLUSH) 0.9 %
5-40 SYRINGE (ML) INJECTION AS NEEDED
Status: DISCONTINUED | OUTPATIENT
Start: 2023-02-06 | End: 2023-02-10 | Stop reason: HOSPADM

## 2023-02-06 RX ORDER — ACETAMINOPHEN 650 MG/1
650 SUPPOSITORY RECTAL
Status: DISCONTINUED | OUTPATIENT
Start: 2023-02-06 | End: 2023-02-10 | Stop reason: HOSPADM

## 2023-02-06 RX ORDER — NALBUPHINE HYDROCHLORIDE 10 MG/ML
10 INJECTION, SOLUTION INTRAMUSCULAR; INTRAVENOUS; SUBCUTANEOUS
Status: DISCONTINUED | OUTPATIENT
Start: 2023-02-06 | End: 2023-02-06

## 2023-02-06 RX ORDER — DIPHENHYDRAMINE HCL 25 MG
25 CAPSULE ORAL
Status: DISCONTINUED | OUTPATIENT
Start: 2023-02-06 | End: 2023-02-10 | Stop reason: HOSPADM

## 2023-02-06 RX ORDER — MAGNESIUM SULFATE HEPTAHYDRATE 40 MG/ML
1 INJECTION, SOLUTION INTRAVENOUS CONTINUOUS
Status: DISCONTINUED | OUTPATIENT
Start: 2023-02-07 | End: 2023-02-08

## 2023-02-06 RX ORDER — ACETAMINOPHEN 325 MG/1
650 TABLET ORAL
Status: DISCONTINUED | OUTPATIENT
Start: 2023-02-06 | End: 2023-02-10 | Stop reason: HOSPADM

## 2023-02-06 RX ORDER — LIDOCAINE HYDROCHLORIDE 10 MG/ML
20 INJECTION, SOLUTION EPIDURAL; INFILTRATION; INTRACAUDAL; PERINEURAL AS NEEDED
Status: DISCONTINUED | OUTPATIENT
Start: 2023-02-06 | End: 2023-02-07 | Stop reason: HOSPADM

## 2023-02-06 RX ORDER — METHYLERGONOVINE MALEATE 0.2 MG/ML
0.2 INJECTION INTRAVENOUS AS NEEDED
Status: DISCONTINUED | OUTPATIENT
Start: 2023-02-06 | End: 2023-02-07 | Stop reason: HOSPADM

## 2023-02-06 RX ORDER — LABETALOL HYDROCHLORIDE 5 MG/ML
40 INJECTION, SOLUTION INTRAVENOUS
Status: COMPLETED | OUTPATIENT
Start: 2023-02-06 | End: 2023-02-06

## 2023-02-06 RX ORDER — LABETALOL 200 MG/1
200 TABLET, FILM COATED ORAL EVERY 12 HOURS
Status: DISCONTINUED | OUTPATIENT
Start: 2023-02-06 | End: 2023-02-06

## 2023-02-06 RX ORDER — OXYTOCIN/RINGER'S LACTATE 30/500 ML
10 PLASTIC BAG, INJECTION (ML) INTRAVENOUS AS NEEDED
Status: DISCONTINUED | OUTPATIENT
Start: 2023-02-06 | End: 2023-02-07 | Stop reason: HOSPADM

## 2023-02-06 RX ORDER — BUTORPHANOL TARTRATE 2 MG/ML
2 INJECTION INTRAMUSCULAR; INTRAVENOUS
Status: DISCONTINUED | OUTPATIENT
Start: 2023-02-06 | End: 2023-02-07 | Stop reason: HOSPADM

## 2023-02-06 RX ORDER — HYDROMORPHONE HYDROCHLORIDE 1 MG/ML
1 INJECTION, SOLUTION INTRAMUSCULAR; INTRAVENOUS; SUBCUTANEOUS
Status: DISCONTINUED | OUTPATIENT
Start: 2023-02-06 | End: 2023-02-07 | Stop reason: HOSPADM

## 2023-02-06 RX ORDER — MINERAL OIL
30 OIL (ML) ORAL AS NEEDED
Status: DISCONTINUED | OUTPATIENT
Start: 2023-02-06 | End: 2023-02-07 | Stop reason: HOSPADM

## 2023-02-06 RX ORDER — MAGNESIUM SULFATE HEPTAHYDRATE 40 MG/ML
INJECTION, SOLUTION INTRAVENOUS
Status: COMPLETED
Start: 2023-02-06 | End: 2023-02-06

## 2023-02-06 RX ORDER — ZOLPIDEM TARTRATE 5 MG/1
5 TABLET ORAL
Status: DISCONTINUED | OUTPATIENT
Start: 2023-02-06 | End: 2023-02-10 | Stop reason: HOSPADM

## 2023-02-06 RX ORDER — ONDANSETRON 4 MG/1
4 TABLET, ORALLY DISINTEGRATING ORAL
Status: DISCONTINUED | OUTPATIENT
Start: 2023-02-06 | End: 2023-02-10 | Stop reason: HOSPADM

## 2023-02-06 RX ORDER — LABETALOL HYDROCHLORIDE 5 MG/ML
20 INJECTION, SOLUTION INTRAVENOUS
Status: COMPLETED | OUTPATIENT
Start: 2023-02-06 | End: 2023-02-06

## 2023-02-06 RX ORDER — TERBUTALINE SULFATE 1 MG/ML
0.25 INJECTION SUBCUTANEOUS
Status: DISCONTINUED | OUTPATIENT
Start: 2023-02-06 | End: 2023-02-07 | Stop reason: HOSPADM

## 2023-02-06 RX ORDER — SODIUM CHLORIDE, SODIUM LACTATE, POTASSIUM CHLORIDE, CALCIUM CHLORIDE 600; 310; 30; 20 MG/100ML; MG/100ML; MG/100ML; MG/100ML
125 INJECTION, SOLUTION INTRAVENOUS CONTINUOUS
Status: DISCONTINUED | OUTPATIENT
Start: 2023-02-06 | End: 2023-02-07 | Stop reason: SDUPTHER

## 2023-02-06 RX ORDER — CALCIUM CARBONATE 200(500)MG
400 TABLET,CHEWABLE ORAL
Status: DISCONTINUED | OUTPATIENT
Start: 2023-02-06 | End: 2023-02-10 | Stop reason: HOSPADM

## 2023-02-06 RX ORDER — CALCIUM GLUCONATE 20 MG/ML
1 INJECTION, SOLUTION INTRAVENOUS AS NEEDED
Status: DISCONTINUED | OUTPATIENT
Start: 2023-02-06 | End: 2023-02-10 | Stop reason: HOSPADM

## 2023-02-06 RX ORDER — ACETAMINOPHEN 500 MG
1000 TABLET ORAL
Status: DISCONTINUED | OUTPATIENT
Start: 2023-02-06 | End: 2023-02-10 | Stop reason: HOSPADM

## 2023-02-06 RX ORDER — HYDRALAZINE HYDROCHLORIDE 20 MG/ML
10 INJECTION INTRAMUSCULAR; INTRAVENOUS
Status: ACTIVE | OUTPATIENT
Start: 2023-02-06 | End: 2023-02-07

## 2023-02-06 RX ORDER — OXYTOCIN/0.9 % SODIUM CHLORIDE 30/500 ML
0-20 PLASTIC BAG, INJECTION (ML) INTRAVENOUS
Status: DISCONTINUED | OUTPATIENT
Start: 2023-02-06 | End: 2023-02-07

## 2023-02-06 RX ORDER — LABETALOL HYDROCHLORIDE 5 MG/ML
80 INJECTION, SOLUTION INTRAVENOUS
Status: DISPENSED | OUTPATIENT
Start: 2023-02-06 | End: 2023-02-07

## 2023-02-06 RX ORDER — SODIUM CHLORIDE 0.9 % (FLUSH) 0.9 %
5-40 SYRINGE (ML) INJECTION EVERY 8 HOURS
Status: DISCONTINUED | OUTPATIENT
Start: 2023-02-06 | End: 2023-02-10 | Stop reason: HOSPADM

## 2023-02-06 RX ORDER — ONDANSETRON 2 MG/ML
4 INJECTION INTRAMUSCULAR; INTRAVENOUS
Status: DISCONTINUED | OUTPATIENT
Start: 2023-02-06 | End: 2023-02-10 | Stop reason: HOSPADM

## 2023-02-06 RX ORDER — NIFEDIPINE 30 MG/1
30 TABLET, EXTENDED RELEASE ORAL
Status: COMPLETED | OUTPATIENT
Start: 2023-02-06 | End: 2023-02-06

## 2023-02-06 RX ORDER — NALBUPHINE HYDROCHLORIDE 10 MG/ML
5 INJECTION, SOLUTION INTRAMUSCULAR; INTRAVENOUS; SUBCUTANEOUS
Status: DISCONTINUED | OUTPATIENT
Start: 2023-02-06 | End: 2023-02-07 | Stop reason: HOSPADM

## 2023-02-06 RX ORDER — OXYTOCIN/0.9 % SODIUM CHLORIDE 30/500 ML
87.3 PLASTIC BAG, INJECTION (ML) INTRAVENOUS AS NEEDED
Status: COMPLETED | OUTPATIENT
Start: 2023-02-06 | End: 2023-02-07

## 2023-02-06 RX ORDER — MAGNESIUM SULFATE HEPTAHYDRATE 40 MG/ML
4 INJECTION, SOLUTION INTRAVENOUS ONCE
Status: COMPLETED | OUTPATIENT
Start: 2023-02-06 | End: 2023-02-06

## 2023-02-06 RX ORDER — SODIUM CHLORIDE, SODIUM LACTATE, POTASSIUM CHLORIDE, CALCIUM CHLORIDE 600; 310; 30; 20 MG/100ML; MG/100ML; MG/100ML; MG/100ML
75 INJECTION, SOLUTION INTRAVENOUS CONTINUOUS
Status: DISCONTINUED | OUTPATIENT
Start: 2023-02-06 | End: 2023-02-10 | Stop reason: HOSPADM

## 2023-02-06 RX ORDER — LABETALOL 200 MG/1
200 TABLET, FILM COATED ORAL 3 TIMES DAILY
Status: DISCONTINUED | OUTPATIENT
Start: 2023-02-06 | End: 2023-02-10 | Stop reason: HOSPADM

## 2023-02-06 RX ADMIN — LABETALOL HYDROCHLORIDE 40 MG: 5 INJECTION INTRAVENOUS at 12:26

## 2023-02-06 RX ADMIN — SODIUM CHLORIDE, SODIUM LACTATE, POTASSIUM CHLORIDE, AND CALCIUM CHLORIDE 125 ML/HR: 600; 310; 30; 20 INJECTION, SOLUTION INTRAVENOUS at 12:09

## 2023-02-06 RX ADMIN — LABETALOL HYDROCHLORIDE 200 MG: 200 TABLET, FILM COATED ORAL at 15:20

## 2023-02-06 RX ADMIN — NIFEDIPINE 30 MG: 30 TABLET, EXTENDED RELEASE ORAL at 12:02

## 2023-02-06 RX ADMIN — NALBUPHINE HYDROCHLORIDE 5 MG: 10 INJECTION, SOLUTION INTRAMUSCULAR; INTRAVENOUS; SUBCUTANEOUS at 21:23

## 2023-02-06 RX ADMIN — Medication 2 MILLI-UNITS/MIN: at 16:31

## 2023-02-06 RX ADMIN — MAGNESIUM SULFATE HEPTAHYDRATE 2 G/HR: 40 INJECTION, SOLUTION INTRAVENOUS at 15:49

## 2023-02-06 RX ADMIN — LABETALOL HYDROCHLORIDE 20 MG: 5 INJECTION INTRAVENOUS at 11:58

## 2023-02-06 RX ADMIN — LABETALOL HYDROCHLORIDE 200 MG: 200 TABLET, FILM COATED ORAL at 20:19

## 2023-02-06 RX ADMIN — SODIUM CHLORIDE, POTASSIUM CHLORIDE, SODIUM LACTATE AND CALCIUM CHLORIDE 500 ML: 600; 310; 30; 20 INJECTION, SOLUTION INTRAVENOUS at 19:38

## 2023-02-06 RX ADMIN — MAGNESIUM SULFATE HEPTAHYDRATE 4 G: 40 INJECTION, SOLUTION INTRAVENOUS at 15:25

## 2023-02-06 NOTE — PROGRESS NOTES
1019 : pt sent in from office for non-reactive NST. EFM and TOCO applied, pt left urine to dip. Pt states baby is moving as normal. Denies headache, RUQ pain, visual changes. CNM aware of pt    1119 : CNM aware of severe pressure, TAYLOR Robert at bedside to discuss plan. Labs ordered. 1133 : second severe pressure noted, IV being placed by second RN, CNM aware, orders are being placed. 1158 : labetalol given     1228 : labetalol given, lab at bedside to draw labs. CNM at bedside to discuss plan of care    1358 : pt transferred to LD 4 for admission. Valeria Thomas RN taking over pt at this time, care relinquished.

## 2023-02-06 NOTE — PROGRESS NOTES
1359 Bedside and Verbal shift change report given to KETTY Tarango and Maurice Becerra RN  (oncoming nurse) by Rene Powell. Arnold Grullon RN  (offgoing nurse). Report included the following information SBAR, Kardex, Procedure Summary, Intake/Output, MAR, Accordion, and Recent Results. 1430 At bedside for cook balloon placement. Sterile speculum exam performed. Cook balloon placed by TAYLOR Robert CNM. 80mL in uterine balloon and  80 mL in vaginal balloon. Cook balloon catheter secured to leg. Patient tolerated well.

## 2023-02-06 NOTE — PROGRESS NOTES
1400 CNM at bedside. 1408 Bedside ultrasound provided by CNM. 1925 Bedside and Verbal shift change report given to FELIX Michelle RN (oncoming nurse) by KETTY Sanderson RN and Belen Cummins RN (offgoing nurse). Report included the following information SBAR, Kardex, Procedure Summary, Intake/Output, MAR, Accordion, Recent Results, and Med Rec Status.

## 2023-02-06 NOTE — PROGRESS NOTES
Procedure explained to the patient and her significant other. Consent obtained. A sterile speculum was inserted in the vaginal, cervix visualized. Tangoe cervical ripening balloon inserted into the cervix and 50mL normal saline inserted into the uterine balloon. Speculum removed. SVE to verify placement of vaginal balloon. Vaginal balloon filled with 30mL normal saline. Additional 30 mL normal saline placed in uterine ballooon and 50 mL normal saline in vaginal balloon for a total of 80 mL/80mL in each balloon. Catheter taped to left thigh. Patient and fetus tolerated procedure well.      Sobia Valdovinos CNM   February 6, 2023

## 2023-02-06 NOTE — H&P
Obstetrical History and Physical    Subjective:     Date of Admission: 2023    Patient is a 24 y.o.   [place] female admitted with CHTN with superimposed preeclampsia at 29 6/7 weeks gestation. History significant for sickle cell trait, CHTN vs GHTN, GBS unknown, IUGR 3%. For Obstetric history, see tristanantal.    For Review of Systems, see prenatal    Past Medical History:   Diagnosis Date    Chlamydia     Essential hypertension     Gestational hypertension     Sickle cell trait syndrome ([facility] [address]       Past Surgical History:   Procedure Laterality Date    HX LAP CHOLECYSTECTOMY        Prior to Admission medications    Medication Sig Start Date End Date Taking? Authorizing Provider   prenatal vit/iron fum/folic ac (PRENATAL 1+1 PO) Take 1 Tablet by mouth daily. Provider, Historical   ondansetron hcl (ZOFRAN) 8 mg tablet Take 8 mg by mouth every eight (8) hours as needed for Nausea or Vomiting. Patient not taking: Reported on 2023    Provider, Historical   labetaloL (NORMODYNE) 200 mg tablet Take 1 Tablet by mouth two (2) times a day. Patient taking differently: Take 200 mg by mouth three (3) times daily as needed. Indications: high blood pressure 22   Lizandro Robert, CNM     Allergies   Allergen Reactions    Topiramate Anaphylaxis      Social History     Tobacco Use    Smoking status: Never    Smokeless tobacco: Never   Substance Use Topics    Alcohol use: Not Currently      History reviewed. No pertinent family history. Objective:     Blood pressure (!) 154/107, pulse 91, temperature 98.5 °F (36.9 °C), resp. rate 15, height 4' 11\" (1.499 m), weight 94.3 kg (208 lb), SpO2 98 %. Temp (24hrs), Av.5 °F (36.9 °C), Min:98.5 °F (36.9 °C), Max:98.5 °F (36.9 °C)        No intake/output data recorded. No intake/output data recorded. HEENT: No pallor, no jaundice, Thyroid and throat normal  RESPIRATORY: Clear to A & P  CVS: pulse reg, HS normal  ABDOMEN: Gravid.  Vertex (Oblique on US). EFW=4 lb +-1lb. No abnormal tenderness. Pelvic: Cervix 1,   Effaced: 50%  Station:  -2    Data Review:   Recent Results (from the past 24 hour(s))   POC URINE MACROSCOPIC    Collection Time: 02/06/23 10:28 AM   Result Value Ref Range    Color YELLOW      Appearance CLEAR      Spec. gravity (POC) 1.015 1.001 - 1.023      pH, urine  (POC) 6.0 5.0 - 9.0      Protein (POC) TRACE (A) NEG mg/dL    Glucose, urine (POC) Negative NEG mg/dL    Ketones (POC) Negative NEG mg/dL    Bilirubin (POC) Negative NEG      Blood (POC) Trace Intact (A) NEG      Urobilinogen (POC) 0.2 0.2 - 1.0 EU/dL    Nitrite (POC) Negative NEG      Leukocyte esterase (POC) Negative NEG      Performed by Cipriano Bailey    PROTEIN/CREATININE RATIO, URINE    Collection Time: 02/06/23 11:20 AM   Result Value Ref Range    Protein, urine random 22 (H) <11.9 mg/dL    Creatinine, urine random 52.00 30 - 125 mg/dL    Protein/Creat. urine Ratio 0.4     CBC W/O DIFF    Collection Time: 02/06/23 12:05 PM   Result Value Ref Range    WBC 15.6 (H) 4.6 - 13.2 K/uL    RBC 3.82 (L) 4.20 - 5.30 M/uL    HGB 11.7 (L) 12.0 - 16.0 g/dL    HCT 33.1 (L) 35.0 - 45.0 %    MCV 86.6 78.0 - 100.0 FL    MCH 30.6 24.0 - 34.0 PG    MCHC 35.3 31.0 - 37.0 g/dL    RDW 13.2 11.6 - 14.5 %    PLATELET 828 248 - 900 K/uL    MPV 9.7 9.2 - 11.8 FL    NRBC 0.0 0  WBC    ABSOLUTE NRBC 0.00 0.00 - 6.74 K/uL   METABOLIC PANEL, COMPREHENSIVE    Collection Time: 02/06/23 12:32 PM   Result Value Ref Range    Sodium 138 136 - 145 mmol/L    Potassium 4.4 3.5 - 5.5 mmol/L    Chloride 107 100 - 111 mmol/L    CO2 28 21 - 32 mmol/L    Anion gap 3 3.0 - 18 mmol/L    Glucose 77 74 - 99 mg/dL    BUN 11 7.0 - 18 MG/DL    Creatinine 0.59 (L) 0.6 - 1.3 MG/DL    BUN/Creatinine ratio 19 12 - 20      eGFR >60 >60 ml/min/1.73m2    Calcium 9.2 8.5 - 10.1 MG/DL    Bilirubin, total 0.2 0.2 - 1.0 MG/DL    ALT (SGPT) 20 13 - 56 U/L    AST (SGOT) 20 10 - 38 U/L    Alk.  phosphatase 144 (H) 45 - 117 U/L    Protein, total 6.1 (L) 6.4 - 8.2 g/dL    Albumin 2.5 (L) 3.4 - 5.0 g/dL    Globulin 3.6 2.0 - 4.0 g/dL    A-G Ratio 0.7 (L) 0.8 - 1.7     URIC ACID    Collection Time: 02/06/23 12:32 PM   Result Value Ref Range    Uric acid 5.9 2.6 - 7.2 MG/DL     Monitor:  Reactivity:present Variability:present Baseline:within normal limits    Assessment:     Active Problems:    IUP (intrauterine pregnancy), incidental (2/6/2023)      34 weeks gestation of pregnancy (2/6/2023)      Gestational hypertension, third trimester (2/6/2023)      Preeclampsia, severe, third trimester (2/6/2023)      Normal IUP (intrauterine pregnancy) on prenatal ultrasound, third trimester (2/6/2023)        Plan:       Check labs:  CBC, T&S, CMP, Uric acid, LDH, PC Ratio Urine    Disposition:   Admit to labor and delivery  Labs as ordered  IV - LR  Cook balloon for IOL  Low dose pitocin with cook   Mag Sulfate 4 gm bolus/2 gm hr   GBS culture  Jamshid consult  Steroids complete 1 wk ago x 2 doses   Anesthesia consult for LORAINE if desires  Anticipate vaginal delivery  CS for maternal/fetal indications  Dr. Sean Enciso aware of admission Behzad SANDOVAL saw and examined patient.     Signed By: Robert Henriquez CNM                         February 6, 2023

## 2023-02-06 NOTE — CONSULTS
Alexida 27 Yenny 43  Prenatal Consult  Note Created Date/Time 2023 15:04:11  Note Date Note Time [facility]   2023 15:30:00 [id number]   [facility] Name  [facility]  First Name Last Name Place of Service   [address] and Delivery   Reason for Consultation  34+ week IUP, mom with CHTN and super-imposed preE     Maternal History   Mother's Age Blood Type Mother's Race    1995 25 B Pos Black 1     RPR Serology HIV Rubella GBS HBsAg Prenatal Care EDC OB   Non-Reactive Negative Immune Unknown Negative Yes 2023   Family History   N/C  Pregnancy Complications  Chronic hypertension, 3rd trimester (O10.013)  Comment  with superimposed preE, txd with labetolol outpatient, plus procardia inpatient  Poor fetal growth, 3rd trimester, fetus 1 (G40.5024)  Maternal Steroids: Yes  Last Dose Date Last Dose Time Next Recent Dose Date Next Recent Dose Time   2023 16:00:00 2023 16:00:00   Maternal Medications: Yes  Labetalol  Comment  added procardia inpatient  Pregnancy Comment  Hx sickle trait, cholecystecomy  Present Plan  Nonreactive NST, admitted for induction now that approaching 35 weeks adjusted age     Discussion/Counseling  Explained minimum for nursery trial is 35 weeks, 2000g. Explained reason for admission to nicu outside of weight and GA may include resp problems, hypoglycemia, cold, etc.  Explained will need to pass car seat test before DC. To go home, needs to be on all PO feeds, RA/OC, no resp issues, gaining weight of taking in enough by mouth. Recommendations  Safe to deliver here  The total length of floor unit time was 35 minute(s). Counseling and/or coordination of care dominated more than fifty percent of the time.     Authenticated by: Keshia Williamson MD   Date/Time: 2023 15:41

## 2023-02-07 ENCOUNTER — ANESTHESIA EVENT (OUTPATIENT)
Dept: LABOR AND DELIVERY | Age: 28
End: 2023-02-07
Payer: COMMERCIAL

## 2023-02-07 ENCOUNTER — ANESTHESIA (OUTPATIENT)
Dept: LABOR AND DELIVERY | Age: 28
End: 2023-02-07
Payer: COMMERCIAL

## 2023-02-07 LAB
MAGNESIUM SERPL-MCNC: 5.8 MG/DL (ref 1.6–2.6)
MAGNESIUM SERPL-MCNC: 6 MG/DL (ref 1.6–2.6)
MAGNESIUM SERPL-MCNC: 6.1 MG/DL (ref 1.6–2.6)

## 2023-02-07 PROCEDURE — 74011250636 HC RX REV CODE- 250/636: Performed by: SPECIALIST

## 2023-02-07 PROCEDURE — 77030010507 HC ADH SKN DERMBND J&J -B: Performed by: OBSTETRICS & GYNECOLOGY

## 2023-02-07 PROCEDURE — 74011000258 HC RX REV CODE- 258

## 2023-02-07 PROCEDURE — 74011250636 HC RX REV CODE- 250/636: Performed by: ADVANCED PRACTICE MIDWIFE

## 2023-02-07 PROCEDURE — 74011250637 HC RX REV CODE- 250/637: Performed by: ADVANCED PRACTICE MIDWIFE

## 2023-02-07 PROCEDURE — 74011250636 HC RX REV CODE- 250/636

## 2023-02-07 PROCEDURE — 10H07YZ INSERTION OF OTHER DEVICE INTO PRODUCTS OF CONCEPTION, VIA NATURAL OR ARTIFICIAL OPENING: ICD-10-PCS

## 2023-02-07 PROCEDURE — 00HU33Z INSERTION OF INFUSION DEVICE INTO SPINAL CANAL, PERCUTANEOUS APPROACH: ICD-10-PCS | Performed by: SPECIALIST

## 2023-02-07 PROCEDURE — 76060000078 HC EPIDURAL ANESTHESIA

## 2023-02-07 PROCEDURE — 36415 COLL VENOUS BLD VENIPUNCTURE: CPT

## 2023-02-07 PROCEDURE — 83735 ASSAY OF MAGNESIUM: CPT

## 2023-02-07 PROCEDURE — 4A0H7CZ MEASUREMENT OF PRODUCTS OF CONCEPTION, CARDIAC RATE, VIA NATURAL OR ARTIFICIAL OPENING: ICD-10-PCS

## 2023-02-07 PROCEDURE — 77030040361 HC SLV COMPR DVT MDII -B: Performed by: OBSTETRICS & GYNECOLOGY

## 2023-02-07 PROCEDURE — 77030011265 HC ELECTRD BLD HEX COVD -A: Performed by: OBSTETRICS & GYNECOLOGY

## 2023-02-07 PROCEDURE — 74011000250 HC RX REV CODE- 250: Performed by: SPECIALIST

## 2023-02-07 PROCEDURE — 75410000003 HC RECOV DEL/VAG/CSECN EA 0.5 HR: Performed by: OBSTETRICS & GYNECOLOGY

## 2023-02-07 PROCEDURE — 74011000250 HC RX REV CODE- 250: Performed by: NURSE ANESTHETIST, CERTIFIED REGISTERED

## 2023-02-07 PROCEDURE — 2709999900 HC NON-CHARGEABLE SUPPLY: Performed by: OBSTETRICS & GYNECOLOGY

## 2023-02-07 PROCEDURE — 74011000258 HC RX REV CODE- 258: Performed by: NURSE ANESTHETIST, CERTIFIED REGISTERED

## 2023-02-07 PROCEDURE — 76010000391 HC C SECN FIRST 1 HR: Performed by: OBSTETRICS & GYNECOLOGY

## 2023-02-07 PROCEDURE — 77030031139 HC SUT VCRL2 J&J -A: Performed by: OBSTETRICS & GYNECOLOGY

## 2023-02-07 PROCEDURE — 74011250636 HC RX REV CODE- 250/636: Performed by: NURSE ANESTHETIST, CERTIFIED REGISTERED

## 2023-02-07 PROCEDURE — 65270000029 HC RM PRIVATE

## 2023-02-07 PROCEDURE — 74011000258 HC RX REV CODE- 258: Performed by: SPECIALIST

## 2023-02-07 PROCEDURE — 76060000032 HC ANESTHESIA 0.5 TO 1 HR: Performed by: OBSTETRICS & GYNECOLOGY

## 2023-02-07 PROCEDURE — 74011250637 HC RX REV CODE- 250/637

## 2023-02-07 PROCEDURE — 88307 TISSUE EXAM BY PATHOLOGIST: CPT

## 2023-02-07 PROCEDURE — 77030002933 HC SUT MCRYL J&J -A: Performed by: OBSTETRICS & GYNECOLOGY

## 2023-02-07 RX ORDER — EPHEDRINE SULFATE/0.9% NACL/PF 50 MG/5 ML
SYRINGE (ML) INTRAVENOUS AS NEEDED
Status: DISCONTINUED | OUTPATIENT
Start: 2023-02-07 | End: 2023-02-07 | Stop reason: HOSPADM

## 2023-02-07 RX ORDER — PHENYLEPHRINE HCL IN 0.9% NACL 1 MG/10 ML
80 SYRINGE (ML) INTRAVENOUS AS NEEDED
Status: DISCONTINUED | OUTPATIENT
Start: 2023-02-07 | End: 2023-02-07 | Stop reason: HOSPADM

## 2023-02-07 RX ORDER — OXYTOCIN/RINGER'S LACTATE 30/500 ML
10 PLASTIC BAG, INJECTION (ML) INTRAVENOUS AS NEEDED
Status: DISCONTINUED | OUTPATIENT
Start: 2023-02-07 | End: 2023-02-10 | Stop reason: HOSPADM

## 2023-02-07 RX ORDER — OXYCODONE AND ACETAMINOPHEN 5; 325 MG/1; MG/1
1-2 TABLET ORAL
Status: DISCONTINUED | OUTPATIENT
Start: 2023-02-07 | End: 2023-02-10 | Stop reason: HOSPADM

## 2023-02-07 RX ORDER — LANOLIN ALCOHOL/MO/W.PET/CERES
3 CREAM (GRAM) TOPICAL
Status: DISCONTINUED | OUTPATIENT
Start: 2023-02-07 | End: 2023-02-10 | Stop reason: HOSPADM

## 2023-02-07 RX ORDER — ACETAMINOPHEN 325 MG/1
650 TABLET ORAL
Status: DISCONTINUED | OUTPATIENT
Start: 2023-02-07 | End: 2023-02-10 | Stop reason: HOSPADM

## 2023-02-07 RX ORDER — ONDANSETRON 2 MG/ML
4 INJECTION INTRAMUSCULAR; INTRAVENOUS
Status: DISCONTINUED | OUTPATIENT
Start: 2023-02-07 | End: 2023-02-07 | Stop reason: HOSPADM

## 2023-02-07 RX ORDER — OXYTOCIN/0.9 % SODIUM CHLORIDE 30/500 ML
0-20 PLASTIC BAG, INJECTION (ML) INTRAVENOUS
Status: DISCONTINUED | OUTPATIENT
Start: 2023-02-07 | End: 2023-02-10 | Stop reason: HOSPADM

## 2023-02-07 RX ORDER — TRANEXAMIC ACID 10 MG/ML
INJECTION, SOLUTION INTRAVENOUS AS NEEDED
Status: DISCONTINUED | OUTPATIENT
Start: 2023-02-07 | End: 2023-02-07 | Stop reason: HOSPADM

## 2023-02-07 RX ORDER — NIFEDIPINE 30 MG/1
30 TABLET, EXTENDED RELEASE ORAL DAILY
Status: DISCONTINUED | OUTPATIENT
Start: 2023-02-07 | End: 2023-02-10 | Stop reason: HOSPADM

## 2023-02-07 RX ORDER — LIDOCAINE HYDROCHLORIDE AND EPINEPHRINE 15; 5 MG/ML; UG/ML
INJECTION, SOLUTION EPIDURAL
Status: SHIPPED | OUTPATIENT
Start: 2023-02-07 | End: 2023-02-07

## 2023-02-07 RX ORDER — FENTANYL CITRATE 50 UG/ML
INJECTION, SOLUTION INTRAMUSCULAR; INTRAVENOUS AS NEEDED
Status: DISCONTINUED | OUTPATIENT
Start: 2023-02-07 | End: 2023-02-07 | Stop reason: HOSPADM

## 2023-02-07 RX ORDER — DIPHENHYDRAMINE HYDROCHLORIDE 50 MG/ML
25 INJECTION, SOLUTION INTRAMUSCULAR; INTRAVENOUS
Status: DISCONTINUED | OUTPATIENT
Start: 2023-02-07 | End: 2023-02-07 | Stop reason: HOSPADM

## 2023-02-07 RX ORDER — FENTANYL CITRATE 50 UG/ML
100 INJECTION, SOLUTION INTRAMUSCULAR; INTRAVENOUS ONCE
Status: COMPLETED | OUTPATIENT
Start: 2023-02-07 | End: 2023-02-07

## 2023-02-07 RX ORDER — PROMETHAZINE HYDROCHLORIDE 25 MG/ML
25 INJECTION, SOLUTION INTRAMUSCULAR; INTRAVENOUS
Status: DISCONTINUED | OUTPATIENT
Start: 2023-02-07 | End: 2023-02-10 | Stop reason: HOSPADM

## 2023-02-07 RX ORDER — SIMETHICONE 80 MG
80 TABLET,CHEWABLE ORAL
Status: DISCONTINUED | OUTPATIENT
Start: 2023-02-07 | End: 2023-02-10 | Stop reason: HOSPADM

## 2023-02-07 RX ORDER — MAGNESIUM SULFATE HEPTAHYDRATE 40 MG/ML
INJECTION, SOLUTION INTRAVENOUS
Status: DISPENSED
Start: 2023-02-07 | End: 2023-02-07

## 2023-02-07 RX ORDER — SODIUM CHLORIDE, SODIUM LACTATE, POTASSIUM CHLORIDE, CALCIUM CHLORIDE 600; 310; 30; 20 MG/100ML; MG/100ML; MG/100ML; MG/100ML
INJECTION, SOLUTION INTRAVENOUS
Status: DISCONTINUED | OUTPATIENT
Start: 2023-02-07 | End: 2023-02-07 | Stop reason: HOSPADM

## 2023-02-07 RX ORDER — FENTANYL/ROPIVACAINE/NS/PF 2MCG/ML-.1
PLASTIC BAG, INJECTION (ML) EPIDURAL
Status: COMPLETED
Start: 2023-02-07 | End: 2023-02-07

## 2023-02-07 RX ORDER — SODIUM CHLORIDE, SODIUM LACTATE, POTASSIUM CHLORIDE, CALCIUM CHLORIDE 600; 310; 30; 20 MG/100ML; MG/100ML; MG/100ML; MG/100ML
75 INJECTION, SOLUTION INTRAVENOUS CONTINUOUS
Status: DISPENSED | OUTPATIENT
Start: 2023-02-07 | End: 2023-02-08

## 2023-02-07 RX ORDER — PHENYLEPHRINE HCL IN 0.9% NACL 1 MG/10 ML
SYRINGE (ML) INTRAVENOUS AS NEEDED
Status: DISCONTINUED | OUTPATIENT
Start: 2023-02-07 | End: 2023-02-07 | Stop reason: HOSPADM

## 2023-02-07 RX ORDER — NALBUPHINE HYDROCHLORIDE 10 MG/ML
2.5 INJECTION, SOLUTION INTRAMUSCULAR; INTRAVENOUS; SUBCUTANEOUS
Status: DISCONTINUED | OUTPATIENT
Start: 2023-02-07 | End: 2023-02-07 | Stop reason: HOSPADM

## 2023-02-07 RX ORDER — SODIUM CHLORIDE 0.9 % (FLUSH) 0.9 %
5-40 SYRINGE (ML) INJECTION EVERY 8 HOURS
Status: DISCONTINUED | OUTPATIENT
Start: 2023-02-07 | End: 2023-02-10 | Stop reason: HOSPADM

## 2023-02-07 RX ORDER — FACIAL-BODY WIPES
10 EACH TOPICAL
Status: DISCONTINUED | OUTPATIENT
Start: 2023-02-07 | End: 2023-02-10 | Stop reason: HOSPADM

## 2023-02-07 RX ORDER — FENTANYL CITRATE 50 UG/ML
INJECTION, SOLUTION INTRAMUSCULAR; INTRAVENOUS
Status: COMPLETED
Start: 2023-02-07 | End: 2023-02-07

## 2023-02-07 RX ORDER — OXYTOCIN/0.9 % SODIUM CHLORIDE 30/500 ML
87.3 PLASTIC BAG, INJECTION (ML) INTRAVENOUS AS NEEDED
Status: DISCONTINUED | OUTPATIENT
Start: 2023-02-07 | End: 2023-02-10 | Stop reason: HOSPADM

## 2023-02-07 RX ORDER — SODIUM CHLORIDE 0.9 % (FLUSH) 0.9 %
5-40 SYRINGE (ML) INJECTION AS NEEDED
Status: DISCONTINUED | OUTPATIENT
Start: 2023-02-07 | End: 2023-02-10 | Stop reason: HOSPADM

## 2023-02-07 RX ORDER — NALOXONE HYDROCHLORIDE 0.4 MG/ML
0.2 INJECTION, SOLUTION INTRAMUSCULAR; INTRAVENOUS; SUBCUTANEOUS AS NEEDED
Status: DISCONTINUED | OUTPATIENT
Start: 2023-02-07 | End: 2023-02-07 | Stop reason: HOSPADM

## 2023-02-07 RX ORDER — FENTANYL/ROPIVACAINE/NS/PF 2MCG/ML-.1
1-15 PLASTIC BAG, INJECTION (ML) EPIDURAL
Status: DISCONTINUED | OUTPATIENT
Start: 2023-02-07 | End: 2023-02-07 | Stop reason: HOSPADM

## 2023-02-07 RX ORDER — SODIUM CHLORIDE 0.9 % (FLUSH) 0.9 %
5-40 SYRINGE (ML) INJECTION EVERY 8 HOURS
Status: DISCONTINUED | OUTPATIENT
Start: 2023-02-07 | End: 2023-02-07 | Stop reason: HOSPADM

## 2023-02-07 RX ORDER — SODIUM CHLORIDE 0.9 % (FLUSH) 0.9 %
5-40 SYRINGE (ML) INJECTION AS NEEDED
Status: DISCONTINUED | OUTPATIENT
Start: 2023-02-07 | End: 2023-02-07 | Stop reason: HOSPADM

## 2023-02-07 RX ORDER — KETOROLAC TROMETHAMINE 30 MG/ML
30 INJECTION, SOLUTION INTRAMUSCULAR; INTRAVENOUS EVERY 6 HOURS
Status: DISCONTINUED | OUTPATIENT
Start: 2023-02-07 | End: 2023-02-09

## 2023-02-07 RX ORDER — IBUPROFEN 400 MG/1
800 TABLET ORAL
Status: DISCONTINUED | OUTPATIENT
Start: 2023-02-10 | End: 2023-02-10 | Stop reason: HOSPADM

## 2023-02-07 RX ORDER — EPHEDRINE SULFATE/0.9% NACL/PF 50 MG/5 ML
10 SYRINGE (ML) INTRAVENOUS AS NEEDED
Status: DISCONTINUED | OUTPATIENT
Start: 2023-02-07 | End: 2023-02-07 | Stop reason: HOSPADM

## 2023-02-07 RX ORDER — TRANEXAMIC ACID 10 MG/ML
INJECTION, SOLUTION INTRAVENOUS
Status: COMPLETED
Start: 2023-02-07 | End: 2023-02-07

## 2023-02-07 RX ORDER — CEFAZOLIN SODIUM/WATER 2 G/20 ML
2 SYRINGE (ML) INTRAVENOUS ONCE
Status: DISPENSED | OUTPATIENT
Start: 2023-02-07 | End: 2023-02-08

## 2023-02-07 RX ADMIN — LIDOCAINE HYDROCHLORIDE AND EPINEPHRINE 5 ML: 15; 5 INJECTION, SOLUTION EPIDURAL at 03:14

## 2023-02-07 RX ADMIN — LIDOCAINE HYDROCHLORIDE AND EPINEPHRINE 5 ML: 15; 5 INJECTION, SOLUTION EPIDURAL at 16:04

## 2023-02-07 RX ADMIN — MAGNESIUM SULFATE HEPTAHYDRATE 2 G/HR: 40 INJECTION, SOLUTION INTRAVENOUS at 14:51

## 2023-02-07 RX ADMIN — Medication 10 MG: at 16:29

## 2023-02-07 RX ADMIN — TRANEXAMIC ACID 1 G: 10 INJECTION, SOLUTION INTRAVENOUS at 16:24

## 2023-02-07 RX ADMIN — KETOROLAC TROMETHAMINE 30 MG: 30 INJECTION, SOLUTION INTRAMUSCULAR at 18:02

## 2023-02-07 RX ADMIN — Medication 8 MILLI-UNITS/MIN: at 04:30

## 2023-02-07 RX ADMIN — Medication 200 MCG: at 16:10

## 2023-02-07 RX ADMIN — FENTANYL CITRATE 100 MCG: 50 INJECTION, SOLUTION INTRAMUSCULAR; INTRAVENOUS at 03:23

## 2023-02-07 RX ADMIN — LIDOCAINE HYDROCHLORIDE AND EPINEPHRINE 5 ML: 15; 5 INJECTION, SOLUTION EPIDURAL at 16:03

## 2023-02-07 RX ADMIN — OXYCODONE AND ACETAMINOPHEN 1 TABLET: 5; 325 TABLET ORAL at 21:25

## 2023-02-07 RX ADMIN — LABETALOL HYDROCHLORIDE 200 MG: 200 TABLET, FILM COATED ORAL at 08:13

## 2023-02-07 RX ADMIN — ROPIVACAINE HYDROCHLORIDE 10 ML/HR: 5 INJECTION, SOLUTION EPIDURAL; INFILTRATION; PERINEURAL at 03:47

## 2023-02-07 RX ADMIN — NIFEDIPINE 30 MG: 30 TABLET, EXTENDED RELEASE ORAL at 09:05

## 2023-02-07 RX ADMIN — LIDOCAINE HYDROCHLORIDE AND EPINEPHRINE 5 ML: 15; 5 INJECTION, SOLUTION EPIDURAL at 16:01

## 2023-02-07 RX ADMIN — Medication 500 ML/HR: at 16:16

## 2023-02-07 RX ADMIN — SODIUM CHLORIDE 2.5 MILLION UNITS: 9 INJECTION, SOLUTION INTRAVENOUS at 08:15

## 2023-02-07 RX ADMIN — LIDOCAINE HYDROCHLORIDE AND EPINEPHRINE 5 ML: 15; 5 INJECTION, SOLUTION EPIDURAL at 16:02

## 2023-02-07 RX ADMIN — ROPIVACAINE HYDROCHLORIDE 10 ML/HR: 5 INJECTION, SOLUTION EPIDURAL; INFILTRATION; PERINEURAL at 11:22

## 2023-02-07 RX ADMIN — LABETALOL HYDROCHLORIDE 200 MG: 200 TABLET, FILM COATED ORAL at 13:25

## 2023-02-07 RX ADMIN — SODIUM CHLORIDE, SODIUM LACTATE, POTASSIUM CHLORIDE, AND CALCIUM CHLORIDE 75 ML/HR: 600; 310; 30; 20 INJECTION, SOLUTION INTRAVENOUS at 08:20

## 2023-02-07 RX ADMIN — SODIUM CHLORIDE 5 MILLION UNITS: 900 INJECTION INTRAVENOUS at 04:13

## 2023-02-07 RX ADMIN — Medication 200 MCG: at 16:22

## 2023-02-07 RX ADMIN — SODIUM CHLORIDE, SODIUM LACTATE, POTASSIUM CHLORIDE, AND CALCIUM CHLORIDE: 600; 310; 30; 20 INJECTION, SOLUTION INTRAVENOUS at 16:02

## 2023-02-07 RX ADMIN — FENTANYL CITRATE 100 MCG: 50 INJECTION, SOLUTION INTRAMUSCULAR; INTRAVENOUS at 03:22

## 2023-02-07 RX ADMIN — SODIUM CHLORIDE 2.5 MILLION UNITS: 9 INJECTION, SOLUTION INTRAVENOUS at 12:21

## 2023-02-07 RX ADMIN — Medication 200 MCG: at 16:12

## 2023-02-07 RX ADMIN — Medication 200 MCG: at 16:19

## 2023-02-07 RX ADMIN — FENTANYL CITRATE 100 MCG: 50 INJECTION, SOLUTION INTRAMUSCULAR; INTRAVENOUS at 16:03

## 2023-02-07 RX ADMIN — AZITHROMYCIN MONOHYDRATE 500 MG: 500 INJECTION, POWDER, LYOPHILIZED, FOR SOLUTION INTRAVENOUS at 16:15

## 2023-02-07 RX ADMIN — Medication 10 MG: at 16:27

## 2023-02-07 RX ADMIN — MAGNESIUM SULFATE HEPTAHYDRATE 2 G/HR: 40 INJECTION, SOLUTION INTRAVENOUS at 03:07

## 2023-02-07 RX ADMIN — CEFAZOLIN 2 G: 10 INJECTION, POWDER, FOR SOLUTION INTRAVENOUS; PARENTERAL at 16:12

## 2023-02-07 RX ADMIN — LABETALOL HYDROCHLORIDE 200 MG: 200 TABLET, FILM COATED ORAL at 20:31

## 2023-02-07 RX ADMIN — TERBUTALINE SULFATE 0.25 MG: 1 INJECTION, SOLUTION SUBCUTANEOUS at 15:30

## 2023-02-07 NOTE — PROGRESS NOTES
Problem: Hypertension  Goal: *Blood pressure within specified parameters  Outcome: Progressing Towards Goal  Goal: *Fluid volume balance  Outcome: Progressing Towards Goal  Goal: *Labs within defined limits  Outcome: Progressing Towards Goal     Problem: Patient Education: Go to Patient Education Activity  Goal: Patient/Family Education  Outcome: Progressing Towards Goal     Problem: Vaginal Delivery: Day of Deliver-Laboring  Goal: Off Pathway (Use only if patient is Off Pathway)  Outcome: Progressing Towards Goal  Goal: Activity/Safety  Outcome: Progressing Towards Goal  Goal: Consults, if ordered  Outcome: Progressing Towards Goal  Goal: Diagnostic Test/Procedures  Outcome: Progressing Towards Goal  Goal: Nutrition/Diet  Outcome: Progressing Towards Goal  Goal: Discharge Planning  Outcome: Progressing Towards Goal  Goal: Medications  Outcome: Progressing Towards Goal  Goal: Respiratory  Outcome: Progressing Towards Goal  Goal: Treatments/Interventions/Procedures  Outcome: Progressing Towards Goal  Goal: *Vital signs within defined limits  Outcome: Progressing Towards Goal  Goal: *Labs within defined limits  Outcome: Progressing Towards Goal  Goal: *Hemodynamically stable  Outcome: Progressing Towards Goal  Goal: *Optimal pain control at patient's stated goal  Outcome: Progressing Towards Goal

## 2023-02-07 NOTE — ROUTINE PROCESS
715 Bedside and Verbal shift change report given to CEDRIC Jorgensen and Alfreda Joyce, RN (oncoming nurse) by Matthew Qureshi, RN (offgoing nurse). Report included the following information SBAR, Kardex, MAR, and Recent Results.  Amnioinfusion began   Pitocin halved, CNM aware  152 Pitocin turned off, CNM aware  1530 Terb given, MD notified    [phone] Pt moved to OR for stat , MD in house and requested pt on the table. 1657 PT returned to room for PACU care.  Bedside and Verbal shift change report given to FELIX Brothers (oncoming nurse) by CEDRIC Mendenhall, JOSE (offgoing nurse). Report included the following information SBAR, Kardex, MAR, and Recent Results.

## 2023-02-07 NOTE — ANESTHESIA PROCEDURE NOTES
Epidural Block    Patient location during procedure: OB  Start time: 2/7/2023 3:14 AM  End time: 2/7/2023 3:32 AM  Reason for block: labor epidural  Staffing  Performed: attending   Anesthesiologist: Najma Mcnally MD  Preanesthetic Checklist  Completed: patient identified, IV checked, site marked, risks and benefits discussed, surgical consent, monitors and equipment checked, pre-op evaluation, timeout performed and fire risk safety assessment completed and verbalized  Block Placement  Patient position: sitting  Prep: ChloraPrep  Sterility prep: cap, drape, gloves, gown, hand and mask  Sedation level: no sedation  Approach: midline  Location: lumbar  Lumbar location: L3-L4  Epidural  Loss of resistance technique: air and saline  Guidance: landmark technique  Needle  Needle type: Tuohy   Needle gauge: 17 G  Needle length: 9 cm  Needle insertion depth: 6 cm  Catheter type: end hole  Catheter size: 19 G  Catheter at skin depth: 10 cm  Catheter securement method: clear occlusive dressing, liquid medical adhesive and surgical tape  Test dose: negative  Medications Administered  lidocaine-EPINEPHrine (XYLOCAINE) 1.5 %-1:200,000 Epidural - Epidural   5 mL - 2/7/2023 3:14:00 AM  Assessment  Block outcome: pain improved  Number of attempts: 1  Procedure assessment: patient tolerated procedure well with no immediate complications

## 2023-02-07 NOTE — ANESTHESIA PREPROCEDURE EVALUATION
Relevant Problems   No relevant active problems       Anesthetic History   No history of anesthetic complications            Review of Systems / Medical History  Patient summary reviewed, nursing notes reviewed and pertinent labs reviewed    Pulmonary  Within defined limits                 Neuro/Psych   Within defined limits           Cardiovascular    Hypertension                   GI/Hepatic/Renal                Endo/Other        Morbid obesity     Other Findings              Physical Exam    Airway  Mallampati: II  TM Distance: 4 - 6 cm  Neck ROM: normal range of motion   Mouth opening: Normal     Cardiovascular               Dental  No notable dental hx       Pulmonary                 Abdominal         Other Findings            Anesthetic Plan    ASA: 3  Anesthesia type: epidural            Anesthetic plan and risks discussed with: Patient and Family      R/B discussed including ha backache bleeding infection nerve damage paralysis.

## 2023-02-07 NOTE — PROGRESS NOTES
Labor Progress Note    Patient seen, fetal heart rate and contraction pattern evaluated. Physical Exam:  Pelvic: Cervix 3-4,   Effaced: 50%  Station:  -2     Intact  Contractions: Every 2-2.5 minutes, mild  Fetal Heart Rate: Baseline: 130 per minute  Variability: moderate  Accelerations: yes  Decelerations: none    Assessment:  Not in labor. PLAN:  Reassuring fetal status, Continue plan for vaginal delivery; continue pitocin augmentation.       Stefanie Lam CNM  2/7/2023  3:05 AM

## 2023-02-07 NOTE — PROGRESS NOTES
0317- pt positioned for epidural     0315- Timeout Performed     0318- Catheter Placement     0319- test dose given     0323- Loading dose

## 2023-02-07 NOTE — OP NOTES
Operative Note    Patient: Daysi Alba  YOB: 1995  MRN: [id number]    Date of Procedure: 2023     Pre-Op Diagnosis: IUP at 35 wks fetal intolerance to labor    Post-Op Diagnosis: Same as preoperative diagnosis. Procedure(s):   SECTION    Surgeon(s):  Laureen Pepe MD    Surgical Assistant: None    Anesthesia: Epidural     Estimated Blood Loss (mL):  827    Complications: None    Specimens: cord blood gases pending), placenta    Implants: * No implants in log *    Drains: * No LDAs found *    Findings: VFI, Apgars 2,5,7; probable placental abruption; weight pending    Detailed Description of Procedure:    Section Operative Note     Procedure:   Patient was taken to the OR after informed consent had been obtained. Epidural was bolused. She was then placed in a dorsal supine position with a left lateral tilt. She was then prepped and draped in a sterile fashion. Time out was completed. Attention was turned to the abdomen and an Allis test confirmed adequate anesthesia. A Pfannenstiel skin incision was made 3 cm above the symphysis pubis. The incision was carried down to the fascia. The fascia was opened in the midline with sharp dissection. The rectus muscle was divided bluntly. The peritoneum was entered with good visualization of underlying structures. The bladder blade was inserted. The vesicouterine peritoneum was incised in a semi lunar fashion and the bladder flap was created using blunt and sharp dissection. The uterus was scored in the lower uterine segment and then entered in the midline. The uterine incision was extended bilaterally, transversly. The fetal head was flexed, pressure was applied to the abdomen and the fetus was delivered through the uterine incision. Naso and oropharynx were bulb suctioned. The cord was clamped and cut. The infant was handed to the waiting pediatric nurse. The placenta was manually extracted.  The uterus was cleared of all clot and debris. The incision was closed with 0 Vicryl in a running fashion. A second layer of 0 vicryl was placed in an imbricating fashion. Excellent hemostasis was noted. The uterus was returned to the abdomen. The fascia was closed in a running fashion with 0 vicryl. The skin was closed with Insorb staples and covered with a sterile dressing. The counts were correct. The mother and child tolerated the procedure well. Rebecca Lassiter M.D.         Electronically Signed by Yadira Guerrero MD on 2/7/2023 at 4:30 PM

## 2023-02-07 NOTE — PROGRESS NOTES
Labor Progress Note    Patient seen, fetal heart rate and contraction pattern evaluated. Contractions: Every 1-5 minutes, severe  Fetal Heart Rate: Baseline: 135 per minute  Variability: minimal  Accelerations: no  Decelerations: variable and late  Cat II FHR tracing     Assessment:  Early latent labor. and Fetal intolerance of labor, repetitive late decels. PLAN:  Labor  Progressing normally, Proceed with  Section Nonreassuring fetal status with labor progressing normally, findings consistent with non reassuring fetal status. Recommended proceeding with  delivery. Risks of bleeding, infection, bladder and bowel damage explained to patient and patient's family. They understand the situation and consent to the  delivery. Dr. Abdi Bowen called at [phone] and was advised to call Dr. Cleaster Goodpasture as she is covering her. Spoke with Dr. Cleaster Goodpasture at [phone] and she is en route. Unit, anesthesia and Nursery notified.         [address] [place], [place] [address]JOSE  2023  4:40 PM

## 2023-02-07 NOTE — ANESTHESIA POSTPROCEDURE EVALUATION
Procedure(s):   SECTION. epidural    Anesthesia Post Evaluation      Multimodal analgesia: multimodal analgesia used between 6 hours prior to anesthesia start to PACU discharge  Patient participation: complete - patient participated  Level of consciousness: awake and alert  Pain score: 1  Airway patency: patent  Anesthetic complications: no  Cardiovascular status: acceptable  Respiratory status: acceptable  Hydration status: acceptable  Post anesthesia nausea and vomiting:  none  Final Post Anesthesia Temperature Assessment:  Normothermia (36.0-37.5 degrees C)      INITIAL Post-op Vital signs:   Vitals Value Taken Time   /90 23 1740   Temp 98    Pulse 89 23 1740   Resp 20    SpO2 100 % 23 1741   Vitals shown include unvalidated device data.

## 2023-02-07 NOTE — PROGRESS NOTES
FSE and IUPC removed. Pt education done with pt/family. Anesthesia (Dr. Xi Marinelli) at bedside. Pt to OR via bed.   Dr. Lei Rodriguez on unit [place] [id number]   February 7, 2023

## 2023-02-07 NOTE — PROGRESS NOTES
1325 Pt BP observed to be mildly outside of set parameters, CNHORACE Gonzalez contacted and aware. Advised to give PO meds due at 1400 now.

## 2023-02-07 NOTE — PROGRESS NOTES
OB Labor Note    Assessment:   IOL for CHTN with superimposed Pre-E   Persistent Cat II fetal tracing   Epidural in place    Plan:   Terbutaline . 25mg SQ given  Proceed with  section. Dr. Gómez Holes in route. Subjective:   Pt. does not have any complaints. Pt. is feeling contractions. Pt. is feeling fetal movement. Objective:   Visit Vitals  /77   Pulse 91   Temp 98.4 °F (36.9 °C)   Resp 22   Ht 4' 11\" (1.499 m)   Wt 94.3 kg (208 lb)   SpO2 100%   BMI 42.01 kg/m²      Cervical Exam  Dilation (cm): 5.5  Eff: 100 %  Station: 0  Position: Mid  Vaginal exam done by? : Aretha Nascimento CNM  Membrane Status: AROM   Patient Vitals for the past 4 hrs: Mode Fetal Heart Rate Variability Decelerations Accelerations RN Reviewed Strip? FHR Interventions   23 1507 -- -- -- -- -- -- Amnio Infusion   23 1330 -- -- -- -- -- -- Other (comment)   23 1245 External 130 (!) Less than or equal to 5 BPM Variable No Yes --   23 1230 -- -- -- -- -- Yes --   23 1215 External 130 (!) Less than or equal to 5 BPM Early;Variable No Yes --   23 1200 -- -- -- -- -- Yes --      EFM: Baseline 135, minimal variability, no accelerations, repetitive late decelerations. Ctx q 3-5 minutes lasting 60-80 seconds. Cat II tracing.     Harold Castleman, CNM  2023 3:47 PM

## 2023-02-07 NOTE — ROUTINE PROCESS
1210 Contacted phlebotomy concerning pt mag blood draw that is due. Unable to reach anyone at this time. Will draw at this time.

## 2023-02-08 ENCOUNTER — ANESTHESIA EVENT (OUTPATIENT)
Dept: SURGERY | Age: 28
End: 2023-02-08
Payer: COMMERCIAL

## 2023-02-08 ENCOUNTER — ANESTHESIA (OUTPATIENT)
Dept: SURGERY | Age: 28
End: 2023-02-08
Payer: COMMERCIAL

## 2023-02-08 LAB
BASOPHILS # BLD: 0.1 K/UL (ref 0–0.1)
BASOPHILS NFR BLD: 0 % (ref 0–2)
DIFFERENTIAL METHOD BLD: ABNORMAL
EOSINOPHIL # BLD: 0 K/UL (ref 0–0.4)
EOSINOPHIL NFR BLD: 0 % (ref 0–5)
ERYTHROCYTE [DISTWIDTH] IN BLOOD BY AUTOMATED COUNT: 13.5 % (ref 11.6–14.5)
ERYTHROCYTE [DISTWIDTH] IN BLOOD BY AUTOMATED COUNT: 13.7 % (ref 11.6–14.5)
ERYTHROCYTE [DISTWIDTH] IN BLOOD BY AUTOMATED COUNT: 13.7 % (ref 11.6–14.5)
HCT VFR BLD AUTO: 23.7 % (ref 35–45)
HCT VFR BLD AUTO: 26.2 % (ref 35–45)
HCT VFR BLD AUTO: 29 % (ref 35–45)
HGB BLD-MCNC: 10.1 G/DL (ref 12–16)
HGB BLD-MCNC: 8 G/DL (ref 12–16)
HGB BLD-MCNC: 9 G/DL (ref 12–16)
HISTORY CHECKED?,CKHIST: NORMAL
IMM GRANULOCYTES # BLD AUTO: 0.2 K/UL (ref 0–0.04)
IMM GRANULOCYTES NFR BLD AUTO: 1 % (ref 0–0.5)
LYMPHOCYTES # BLD: 1.8 K/UL (ref 0.9–3.6)
LYMPHOCYTES # BLD: 1.8 K/UL (ref 0.9–3.6)
LYMPHOCYTES # BLD: 2.5 K/UL (ref 0.9–3.6)
LYMPHOCYTES NFR BLD: 7 % (ref 21–52)
LYMPHOCYTES NFR BLD: 8 % (ref 21–52)
LYMPHOCYTES NFR BLD: 9 % (ref 21–52)
MAGNESIUM SERPL-MCNC: 5.3 MG/DL (ref 1.6–2.6)
MAGNESIUM SERPL-MCNC: 6.6 MG/DL (ref 1.6–2.6)
MCH RBC QN AUTO: 29.7 PG (ref 24–34)
MCH RBC QN AUTO: 30.3 PG (ref 24–34)
MCH RBC QN AUTO: 30.4 PG (ref 24–34)
MCHC RBC AUTO-ENTMCNC: 33.8 G/DL (ref 31–37)
MCHC RBC AUTO-ENTMCNC: 34.4 G/DL (ref 31–37)
MCHC RBC AUTO-ENTMCNC: 34.8 G/DL (ref 31–37)
MCV RBC AUTO: 87.1 FL (ref 78–100)
MCV RBC AUTO: 88.1 FL (ref 78–100)
MCV RBC AUTO: 88.5 FL (ref 78–100)
MONOCYTES # BLD: 1.3 K/UL (ref 0.05–1.2)
MONOCYTES # BLD: 1.4 K/UL (ref 0.05–1.2)
MONOCYTES # BLD: 1.9 K/UL (ref 0.05–1.2)
MONOCYTES NFR BLD: 5 % (ref 3–10)
MONOCYTES NFR BLD: 5 % (ref 3–10)
MONOCYTES NFR BLD: 7 % (ref 3–10)
NEUTS SEG # BLD: 21 K/UL (ref 1.8–8)
NEUTS SEG # BLD: 22.2 K/UL (ref 1.8–8)
NEUTS SEG # BLD: 22.6 K/UL (ref 1.8–8)
NEUTS SEG NFR BLD: 82 % (ref 40–73)
NEUTS SEG NFR BLD: 86 % (ref 40–73)
NEUTS SEG NFR BLD: 87 % (ref 40–73)
NRBC # BLD: 0 K/UL (ref 0–0.01)
NRBC BLD-RTO: 0 PER 100 WBC
PLATELET # BLD AUTO: 186 K/UL (ref 135–420)
PLATELET # BLD AUTO: 231 K/UL (ref 135–420)
PLATELET # BLD AUTO: 254 K/UL (ref 135–420)
PMV BLD AUTO: 8.8 FL (ref 9.2–11.8)
PMV BLD AUTO: 9.3 FL (ref 9.2–11.8)
PMV BLD AUTO: 9.4 FL (ref 9.2–11.8)
RBC # BLD AUTO: 2.69 M/UL (ref 4.2–5.3)
RBC # BLD AUTO: 2.96 M/UL (ref 4.2–5.3)
RBC # BLD AUTO: 3.33 M/UL (ref 4.2–5.3)
WBC # BLD AUTO: 24.3 K/UL (ref 4.6–13.2)
WBC # BLD AUTO: 26.1 K/UL (ref 4.6–13.2)
WBC # BLD AUTO: 27 K/UL (ref 4.6–13.2)

## 2023-02-08 PROCEDURE — 77030008477 HC STYL SATN SLP COVD -A: Performed by: SPECIALIST

## 2023-02-08 PROCEDURE — 76010000138 HC OR TIME 0.5 TO 1 HR: Performed by: OBSTETRICS & GYNECOLOGY

## 2023-02-08 PROCEDURE — 74011250637 HC RX REV CODE- 250/637

## 2023-02-08 PROCEDURE — 77030020782 HC GWN BAIR PAWS FLX 3M -B: Performed by: OBSTETRICS & GYNECOLOGY

## 2023-02-08 PROCEDURE — 76210000063 HC OR PH I REC FIRST 0.5 HR: Performed by: OBSTETRICS & GYNECOLOGY

## 2023-02-08 PROCEDURE — 77030008683 HC TU ET CUF COVD -A: Performed by: SPECIALIST

## 2023-02-08 PROCEDURE — 10D17ZZ EXTRACTION OF PRODUCTS OF CONCEPTION, RETAINED, VIA NATURAL OR ARTIFICIAL OPENING: ICD-10-PCS | Performed by: OBSTETRICS & GYNECOLOGY

## 2023-02-08 PROCEDURE — 77030006643: Performed by: SPECIALIST

## 2023-02-08 PROCEDURE — 85025 COMPLETE CBC W/AUTO DIFF WBC: CPT

## 2023-02-08 PROCEDURE — 83735 ASSAY OF MAGNESIUM: CPT

## 2023-02-08 PROCEDURE — 0W3R7ZZ CONTROL BLEEDING IN GENITOURINARY TRACT, VIA NATURAL OR ARTIFICIAL OPENING: ICD-10-PCS | Performed by: OBSTETRICS & GYNECOLOGY

## 2023-02-08 PROCEDURE — 2709999900 HC NON-CHARGEABLE SUPPLY: Performed by: OBSTETRICS & GYNECOLOGY

## 2023-02-08 PROCEDURE — 74011250636 HC RX REV CODE- 250/636: Performed by: OBSTETRICS & GYNECOLOGY

## 2023-02-08 PROCEDURE — 74011000250 HC RX REV CODE- 250: Performed by: ADVANCED PRACTICE MIDWIFE

## 2023-02-08 PROCEDURE — 74011250636 HC RX REV CODE- 250/636: Performed by: ADVANCED PRACTICE MIDWIFE

## 2023-02-08 PROCEDURE — 74011250636 HC RX REV CODE- 250/636: Performed by: SPECIALIST

## 2023-02-08 PROCEDURE — 77030019905 HC CATH URETH INTMIT MDII -A: Performed by: OBSTETRICS & GYNECOLOGY

## 2023-02-08 PROCEDURE — 74011000250 HC RX REV CODE- 250: Performed by: SPECIALIST

## 2023-02-08 PROCEDURE — 0UCG7ZZ EXTIRPATION OF MATTER FROM VAGINA, VIA NATURAL OR ARTIFICIAL OPENING: ICD-10-PCS | Performed by: OBSTETRICS & GYNECOLOGY

## 2023-02-08 PROCEDURE — 77030008578 HC TBNG UTER SUC BUSS -A: Performed by: OBSTETRICS & GYNECOLOGY

## 2023-02-08 PROCEDURE — 88305 TISSUE EXAM BY PATHOLOGIST: CPT

## 2023-02-08 PROCEDURE — 74011250636 HC RX REV CODE- 250/636

## 2023-02-08 PROCEDURE — 36415 COLL VENOUS BLD VENIPUNCTURE: CPT

## 2023-02-08 PROCEDURE — 74011250637 HC RX REV CODE- 250/637: Performed by: ADVANCED PRACTICE MIDWIFE

## 2023-02-08 PROCEDURE — 65270000029 HC RM PRIVATE

## 2023-02-08 PROCEDURE — 77030040361 HC SLV COMPR DVT MDII -B: Performed by: OBSTETRICS & GYNECOLOGY

## 2023-02-08 PROCEDURE — C1726 CATH, BAL DIL, NON-VASCULAR: HCPCS | Performed by: OBSTETRICS & GYNECOLOGY

## 2023-02-08 PROCEDURE — 76060000032 HC ANESTHESIA 0.5 TO 1 HR: Performed by: OBSTETRICS & GYNECOLOGY

## 2023-02-08 RX ORDER — OXYTOCIN/RINGER'S LACTATE 30/500 ML
PLASTIC BAG, INJECTION (ML) INTRAVENOUS
Status: DISCONTINUED | OUTPATIENT
Start: 2023-02-08 | End: 2023-02-08 | Stop reason: HOSPADM

## 2023-02-08 RX ORDER — SODIUM CHLORIDE 0.9 % (FLUSH) 0.9 %
5-40 SYRINGE (ML) INJECTION AS NEEDED
Status: DISCONTINUED | OUTPATIENT
Start: 2023-02-08 | End: 2023-02-08 | Stop reason: HOSPADM

## 2023-02-08 RX ORDER — MIDAZOLAM HYDROCHLORIDE 1 MG/ML
INJECTION, SOLUTION INTRAMUSCULAR; INTRAVENOUS AS NEEDED
Status: DISCONTINUED | OUTPATIENT
Start: 2023-02-08 | End: 2023-02-08 | Stop reason: HOSPADM

## 2023-02-08 RX ORDER — ONDANSETRON 2 MG/ML
INJECTION INTRAMUSCULAR; INTRAVENOUS AS NEEDED
Status: DISCONTINUED | OUTPATIENT
Start: 2023-02-08 | End: 2023-02-08 | Stop reason: HOSPADM

## 2023-02-08 RX ORDER — LIDOCAINE HYDROCHLORIDE 20 MG/ML
INJECTION, SOLUTION EPIDURAL; INFILTRATION; INTRACAUDAL; PERINEURAL AS NEEDED
Status: DISCONTINUED | OUTPATIENT
Start: 2023-02-08 | End: 2023-02-08 | Stop reason: HOSPADM

## 2023-02-08 RX ORDER — CARBOPROST TROMETHAMINE 250 UG/ML
INJECTION, SOLUTION INTRAMUSCULAR
Status: DISPENSED
Start: 2023-02-08 | End: 2023-02-08

## 2023-02-08 RX ORDER — INSULIN LISPRO 100 [IU]/ML
INJECTION, SOLUTION INTRAVENOUS; SUBCUTANEOUS ONCE
Status: DISCONTINUED | OUTPATIENT
Start: 2023-02-08 | End: 2023-02-08 | Stop reason: HOSPADM

## 2023-02-08 RX ORDER — TRANEXAMIC ACID 10 MG/ML
1 INJECTION, SOLUTION INTRAVENOUS ONCE
Status: COMPLETED | OUTPATIENT
Start: 2023-02-08 | End: 2023-02-08

## 2023-02-08 RX ORDER — DEXTROSE MONOHYDRATE 100 MG/ML
0-250 INJECTION, SOLUTION INTRAVENOUS AS NEEDED
Status: DISCONTINUED | OUTPATIENT
Start: 2023-02-08 | End: 2023-02-08 | Stop reason: HOSPADM

## 2023-02-08 RX ORDER — MISOPROSTOL 200 UG/1
800 TABLET ORAL ONCE
Status: COMPLETED | OUTPATIENT
Start: 2023-02-08 | End: 2023-02-08

## 2023-02-08 RX ORDER — TRANEXAMIC ACID 10 MG/ML
INJECTION, SOLUTION INTRAVENOUS
Status: DISPENSED
Start: 2023-02-08 | End: 2023-02-08

## 2023-02-08 RX ORDER — NALOXONE HYDROCHLORIDE 0.4 MG/ML
0.2 INJECTION, SOLUTION INTRAMUSCULAR; INTRAVENOUS; SUBCUTANEOUS AS NEEDED
Status: DISCONTINUED | OUTPATIENT
Start: 2023-02-08 | End: 2023-02-08 | Stop reason: HOSPADM

## 2023-02-08 RX ORDER — EPHEDRINE SULFATE/0.9% NACL/PF 50 MG/5 ML
SYRINGE (ML) INTRAVENOUS AS NEEDED
Status: DISCONTINUED | OUTPATIENT
Start: 2023-02-08 | End: 2023-02-08 | Stop reason: HOSPADM

## 2023-02-08 RX ORDER — CEFAZOLIN SODIUM/WATER 2 G/20 ML
2 SYRINGE (ML) INTRAVENOUS ONCE
Status: COMPLETED | OUTPATIENT
Start: 2023-02-08 | End: 2023-02-08

## 2023-02-08 RX ORDER — PROPOFOL 10 MG/ML
INJECTION, EMULSION INTRAVENOUS AS NEEDED
Status: DISCONTINUED | OUTPATIENT
Start: 2023-02-08 | End: 2023-02-08 | Stop reason: HOSPADM

## 2023-02-08 RX ORDER — SUCCINYLCHOLINE CHLORIDE 100 MG/5ML
SYRINGE (ML) INTRAVENOUS AS NEEDED
Status: DISCONTINUED | OUTPATIENT
Start: 2023-02-08 | End: 2023-02-08 | Stop reason: HOSPADM

## 2023-02-08 RX ORDER — IBUPROFEN 200 MG
4 TABLET ORAL AS NEEDED
Status: DISCONTINUED | OUTPATIENT
Start: 2023-02-08 | End: 2023-02-08 | Stop reason: HOSPADM

## 2023-02-08 RX ORDER — FENTANYL CITRATE 50 UG/ML
25 INJECTION, SOLUTION INTRAMUSCULAR; INTRAVENOUS AS NEEDED
Status: DISCONTINUED | OUTPATIENT
Start: 2023-02-08 | End: 2023-02-08 | Stop reason: HOSPADM

## 2023-02-08 RX ORDER — CEFAZOLIN SODIUM/WATER 2 G/20 ML
SYRINGE (ML) INTRAVENOUS
Status: COMPLETED
Start: 2023-02-08 | End: 2023-02-08

## 2023-02-08 RX ORDER — SODIUM CHLORIDE, SODIUM LACTATE, POTASSIUM CHLORIDE, CALCIUM CHLORIDE 600; 310; 30; 20 MG/100ML; MG/100ML; MG/100ML; MG/100ML
100 INJECTION, SOLUTION INTRAVENOUS CONTINUOUS
Status: DISCONTINUED | OUTPATIENT
Start: 2023-02-08 | End: 2023-02-08 | Stop reason: HOSPADM

## 2023-02-08 RX ORDER — MISOPROSTOL 200 UG/1
TABLET ORAL
Status: COMPLETED
Start: 2023-02-08 | End: 2023-02-08

## 2023-02-08 RX ORDER — FENTANYL CITRATE 50 UG/ML
25 INJECTION, SOLUTION INTRAMUSCULAR; INTRAVENOUS
Status: DISCONTINUED | OUTPATIENT
Start: 2023-02-08 | End: 2023-02-08 | Stop reason: HOSPADM

## 2023-02-08 RX ORDER — ROCURONIUM BROMIDE 10 MG/ML
INJECTION, SOLUTION INTRAVENOUS AS NEEDED
Status: DISCONTINUED | OUTPATIENT
Start: 2023-02-08 | End: 2023-02-08 | Stop reason: HOSPADM

## 2023-02-08 RX ORDER — OXYTOCIN/0.9 % SODIUM CHLORIDE 30/500 ML
PLASTIC BAG, INJECTION (ML) INTRAVENOUS
Status: DISPENSED
Start: 2023-02-08 | End: 2023-02-08

## 2023-02-08 RX ORDER — FENTANYL CITRATE 50 UG/ML
INJECTION, SOLUTION INTRAMUSCULAR; INTRAVENOUS AS NEEDED
Status: DISCONTINUED | OUTPATIENT
Start: 2023-02-08 | End: 2023-02-08 | Stop reason: HOSPADM

## 2023-02-08 RX ORDER — SODIUM CHLORIDE 9 MG/ML
250 INJECTION, SOLUTION INTRAVENOUS AS NEEDED
Status: DISCONTINUED | OUTPATIENT
Start: 2023-02-08 | End: 2023-02-10 | Stop reason: HOSPADM

## 2023-02-08 RX ORDER — SODIUM CHLORIDE 0.9 % (FLUSH) 0.9 %
5-40 SYRINGE (ML) INJECTION EVERY 8 HOURS
Status: DISCONTINUED | OUTPATIENT
Start: 2023-02-08 | End: 2023-02-08 | Stop reason: HOSPADM

## 2023-02-08 RX ORDER — PHENYLEPHRINE HCL IN 0.9% NACL 1 MG/10 ML
SYRINGE (ML) INTRAVENOUS AS NEEDED
Status: DISCONTINUED | OUTPATIENT
Start: 2023-02-08 | End: 2023-02-08 | Stop reason: HOSPADM

## 2023-02-08 RX ADMIN — Medication 200 MCG: at 04:38

## 2023-02-08 RX ADMIN — MISOPROSTOL 800 MCG: 200 TABLET ORAL at 02:54

## 2023-02-08 RX ADMIN — ROCURONIUM BROMIDE 5 MG: 10 INJECTION, SOLUTION INTRAVENOUS at 04:22

## 2023-02-08 RX ADMIN — LIDOCAINE HYDROCHLORIDE 60 MG: 20 INJECTION, SOLUTION EPIDURAL; INFILTRATION; INTRACAUDAL; PERINEURAL at 04:24

## 2023-02-08 RX ADMIN — OXYCODONE AND ACETAMINOPHEN 1 TABLET: 5; 325 TABLET ORAL at 07:21

## 2023-02-08 RX ADMIN — ONDANSETRON HYDROCHLORIDE 4 MG: 2 INJECTION INTRAMUSCULAR; INTRAVENOUS at 04:30

## 2023-02-08 RX ADMIN — PROPOFOL 150 MG: 10 INJECTION, EMULSION INTRAVENOUS at 04:24

## 2023-02-08 RX ADMIN — Medication 100 MG: at 04:24

## 2023-02-08 RX ADMIN — Medication 100 MCG: at 04:35

## 2023-02-08 RX ADMIN — LABETALOL HYDROCHLORIDE 200 MG: 200 TABLET, FILM COATED ORAL at 20:30

## 2023-02-08 RX ADMIN — KETOROLAC TROMETHAMINE 30 MG: 30 INJECTION, SOLUTION INTRAMUSCULAR at 19:33

## 2023-02-08 RX ADMIN — Medication 200 MCG: at 04:41

## 2023-02-08 RX ADMIN — TRANEXAMIC ACID 1000 MG: 10 INJECTION, SOLUTION INTRAVENOUS at 02:53

## 2023-02-08 RX ADMIN — Medication 100 MCG: at 04:40

## 2023-02-08 RX ADMIN — KETOROLAC TROMETHAMINE 30 MG: 30 INJECTION, SOLUTION INTRAMUSCULAR at 00:23

## 2023-02-08 RX ADMIN — PHENYLEPHRINE HYDROCHLORIDE 100 MCG/MIN: 10 INJECTION INTRAVENOUS at 04:48

## 2023-02-08 RX ADMIN — Medication 100 MCG: at 04:30

## 2023-02-08 RX ADMIN — MAGNESIUM SULFATE HEPTAHYDRATE 2 G/HR: 40 INJECTION, SOLUTION INTRAVENOUS at 01:26

## 2023-02-08 RX ADMIN — MIDAZOLAM 2 MG: 1 INJECTION INTRAMUSCULAR; INTRAVENOUS at 04:14

## 2023-02-08 RX ADMIN — NIFEDIPINE 30 MG: 30 TABLET, EXTENDED RELEASE ORAL at 08:57

## 2023-02-08 RX ADMIN — Medication 10 MG: at 04:31

## 2023-02-08 RX ADMIN — Medication 10 MG: at 04:35

## 2023-02-08 RX ADMIN — Medication 10 MG: at 04:40

## 2023-02-08 RX ADMIN — FENTANYL CITRATE 50 MCG: 50 INJECTION, SOLUTION INTRAMUSCULAR; INTRAVENOUS at 04:22

## 2023-02-08 RX ADMIN — SODIUM CHLORIDE, SODIUM LACTATE, POTASSIUM CHLORIDE, AND CALCIUM CHLORIDE: 600; 310; 30; 20 INJECTION, SOLUTION INTRAVENOUS at 04:39

## 2023-02-08 RX ADMIN — Medication 600 ML/HR: at 04:50

## 2023-02-08 RX ADMIN — Medication 100 MCG: at 04:25

## 2023-02-08 RX ADMIN — Medication 2 G: at 04:26

## 2023-02-08 RX ADMIN — KETOROLAC TROMETHAMINE 30 MG: 30 INJECTION, SOLUTION INTRAMUSCULAR at 12:21

## 2023-02-08 RX ADMIN — SODIUM CHLORIDE, POTASSIUM CHLORIDE, SODIUM LACTATE AND CALCIUM CHLORIDE 75 ML/HR: 600; 310; 30; 20 INJECTION, SOLUTION INTRAVENOUS at 12:25

## 2023-02-08 RX ADMIN — Medication 100 MCG: at 04:33

## 2023-02-08 RX ADMIN — Medication 100 MCG: at 04:27

## 2023-02-08 NOTE — PROGRESS NOTES
Problem: Hypertension  Goal: *Blood pressure within specified parameters  Outcome: Progressing Towards Goal  Goal: *Fluid volume balance  Outcome: Progressing Towards Goal  Goal: *Labs within defined limits  Outcome: Progressing Towards Goal     Problem: Patient Education: Go to Patient Education Activity  Goal: Patient/Family Education  Outcome: Progressing Towards Goal     Problem: Falls - Risk of  Goal: *Absence of Falls  Description: Document Radha Fall Risk and appropriate interventions in the flowsheet.   Outcome: Progressing Towards Goal  Note: Fall Risk Interventions:    bedrest

## 2023-02-08 NOTE — PROGRESS NOTES
Name:  Renzo Almaraz  Age:  24 y.o. MRN:  [id number]  CSN:  [id number]  :  1995  2:19 PM    Anesthesia Epidural Duramorph Rounds Note    Referring physician: Ezequiel Freedman MD   Patient status post Procedure(s):   SECTION on 2023    POST OP Day # 1    Visit Vitals  /67   Pulse (!) 102   Temp 37.1 °C (98.7 °F)   Resp 20   Ht 4' 11\" (1.499 m)   Wt 94.3 kg (208 lb)   SpO2 95%   Breastfeeding Unknown   BMI 42.01 kg/m²          Patient rates pain 0 at rest and 1 with movement. Pain is subjectively rated by patient as none. Pain location: abdomen    No complications noted, adequate analgesia from Epidural Duramorph. Continue current postop pain regimen.     Ramakrishna Marvin MD

## 2023-02-08 NOTE — LACTATION NOTE
Set mom up with double electric breast pump and educated on how to use initiation mode, pump hygiene, and safe milk storage due to infant in NICU. Mom to pump q 3 hours for 15 minutes on initiation mode. Mom verbalized understanding and no questions at this time. Gave mom breastmilk labels and syringes. Will remain available.

## 2023-02-08 NOTE — PERIOP NOTES
Pt states her mother is here at the hospital and did not want us to call her grandmother and wake her. States she is aware she is here.

## 2023-02-08 NOTE — OP NOTES
[facility]  OPERATIVE REPORT    Name:  Mona Langley  MR#:   [id number]  :  1995  ACCOUNT #:  [de-identified]  DATE OF SERVICE:  2023    PREOPERATIVE DIAGNOSIS:  Postpartum hemorrhage. POSTOPERATIVE DIAGNOSIS:  Postpartum hemorrhage. PROCEDURE PERFORMED:  Postpartum dilation and curettage with Bakri balloon placement under ultrasound guidance. SURGEON:  Gina Dwyer MD    ASSISTANT:  Hussain Mcclendon. ANESTHESIA:  General.    ANESTHESIOLOGIST:  Dr. Sukhwinder Otto.    COMPLICATIONS:  None. SPECIMENS REMOVED:  Endometrial curettings, clots, possible retained products of conception. IMPLANTS:  Bakri balloon. ESTIMATED BLOOD LOSS:  200 mL of blood with clots expressed, 1100 mL noted on labor and delivery. IV FLUIDS:  1100 mL of lactated Ringer's. URINE OUTPUT:  200 mL of clear urine at the end of procedure. FINDINGS:  200 mL of clots and blood with possible retained products of conception with membranous-appearing tissues noted. Uterine fundus is 2 cm above the umbilicus prior to procedure. Cervix unremarkable. INDICATIONS:  This is a 56-year-old G1, P1-0-0-1 status post a primary C section for non-reassuring fetal status. She has chronic hypertension with *** preeclampsia on magnesium. She was noted to have on and off persistent bleeding status post surgery with copious amounts of blood clots and bleeding expressed at bedside on labor and delivery with approximately 1100 mL of blood loss. Her cervical os was approximately 3 cm dilated, but with large firm clots within the uterus that were not able to be expressed and the patient's intolerance of the procedure prompted surgical management as stated above. Risks, benefits and alternatives were discussed with the patient and her mother prior to surgical start time. PROCEDURE:  The patient was taken to the OR where general anesthesia was obtained without difficulty.   She was prepared and draped in the usual sterile fashion in dorsal lithotomy position with legs in stirrups. A weighted speculum was placed in the vagina and Rudd retractor in the anterior fornix to expose the cervix. A large blood clot was noted within the vaginal vault, approximately 50 mL of blood noted as well. Once removed, the cervix was identified and grasped at the anterior aspect with the main forceps. The cervical os was gradually dilated to allow introduction of a large curette. Under ultrasound guidance, the curettage was performed until the endometrial lining was thinned out throughout. Bakri balloon was placed up to the uterine fundus and filled with 200 mL of normal saline and secured in place. All other instruments were then removed with excellent hemostasis assured at the end of the case. The patient tolerated the procedure well. Sponge, lap, needle, and instrument counts were correct x2. She was given Ancef prior to the surgical start time and taken to recovery area in stable condition.       MD ANA LAURA [email or web address]  D:  02/08/2023 5:09  T:  02/08/2023 13:49  JOB #:  [id number]

## 2023-02-08 NOTE — PROGRESS NOTES
2920- Called to bedside to assess bleeding. Bimanual exam, clots removed. One clot could be felt but unable to remove or express from the uterus. Dr. Linda Dwyer called to evaluate patient. Cytotec and TXA orders given.     5578- Discussed with Dr. Linda Dwyer, plan for an US guided D&C    [facility]

## 2023-02-08 NOTE — PROGRESS NOTES
Patient's morning dose of labetalol held due to being normotensive. Her magnesium has was reduced after receiving a magnesium level of 6.6 early this morning. Given her increased risk for bleeding and stable BP, will discontinue mag.      PTAEL Garcia, JOSEM  February 8, 2023

## 2023-02-08 NOTE — PERIOP NOTES
TRANSFER - OUT REPORT:    Verbal report given to Facundo Ramsay RN(name) on Chivo Lynch  being transferred to L&D(unit) for routine progression of care       Report consisted of patients Situation, Background, Assessment and   Recommendations(SBAR). Information from the following report(s) SBAR was reviewed with the receiving nurse. Lines:   Peripheral IV 02/06/23 Posterior;Right Hand (Active)       Peripheral IV 02/08/23 Right Antecubital (Active)   Site Assessment Clean, dry, & intact 02/08/23 0535   Phlebitis Assessment 0 02/08/23 0535   Infiltration Assessment 0 02/08/23 0535   Dressing Status Clean, dry, & intact 02/08/23 0535   Dressing Type Transparent 02/08/23 0535   Hub Color/Line Status Blue 02/08/23 0535        Opportunity for questions and clarification was provided.       Patient transported with:   Registered Nurse

## 2023-02-08 NOTE — ANESTHESIA POSTPROCEDURE EVALUATION
.  Post-Anesthesia Evaluation & Assessment    Visit Vitals  /78   Pulse 94   Temp 37 °C (98.6 °F)   Resp 20   Ht 4' 11\" (1.499 m)   Wt 94.3 kg (208 lb)   SpO2 94%   Breastfeeding Unknown   BMI 42.01 kg/m²       Nausea/Vomiting: no nausea    Post-operative hydration adequate.     Pain score (VAS): 0    Mental status & Level of consciousness: alert and oriented x 3    Neurological status: moves all extremities, sensation grossly intact    Pulmonary status: airway patent, no supplemental oxygen required    Complications related to anesthesia: none    Additional comments:

## 2023-02-08 NOTE — PERIOP NOTES
TRANSFER - IN REPORT:    Verbal report received from OR nurse and CRNA(name) on Ronni Blakely  being received from OR(unit) for routine post - op      Report consisted of patients Situation, Background, Assessment and   Recommendations(SBAR). Information from the following report(s) SBAR, OR Summary, Procedure Summary, Intake/Output, MAR, Recent Results, and Med Rec Status was reviewed with the receiving nurse. Opportunity for questions and clarification was provided. Assessment completed upon patients arrival to unit and care assumed.

## 2023-02-08 NOTE — ANESTHESIA PREPROCEDURE EVALUATION
Relevant Problems   CARDIOVASCULAR   (+) Hypertension affecting pregnancy   (+) Preeclampsia, severe, third trimester       Anesthetic History   No history of anesthetic complications            Review of Systems / Medical History  Patient summary reviewed, nursing notes reviewed and pertinent labs reviewed    Pulmonary  Within defined limits                 Neuro/Psych   Within defined limits           Cardiovascular    Hypertension                   GI/Hepatic/Renal  Within defined limits              Endo/Other        Morbid obesity     Other Findings   Comments: BMI >40           Physical Exam    Airway  Mallampati: II  TM Distance: 4 - 6 cm  Neck ROM: normal range of motion   Mouth opening: Normal     Cardiovascular               Dental  No notable dental hx       Pulmonary                 Abdominal         Other Findings            Anesthetic Plan    ASA: 3, emergent  Anesthesia type: general          Induction: Intravenous and RSI  Anesthetic plan and risks discussed with: Patient      Patient previously seen by myself for epidural placement. Quick pre-op reassessment due to urgent nature of procedure.

## 2023-02-08 NOTE — PROGRESS NOTES
0725- Bedside and Verbal shift change report given to MOUSTAPHA Jarrett RN (oncoming nurse) by Neal Noriega RN (offgoing nurse). Report included the following information SBAR, OR Summary, Procedure Summary, Intake/Output, MAR, and Recent Results. 0730- Shift assessment complete. Pt denies need at this time. Pain 4/10. Received medication from off-going nurse. Bakri intact and minimal blood in tubing. 0074- Notified BRAXTON Beecrra of recent Bps. Gave verbal order to hold 0800 dose of Labetalol and proceed w/0900 dose of Procardia. Recent /67 and HR 88. Continue to monitor VS.    0930- Pt resting. 46- Verbal order given to stop Magnesium. Order placed by Shaye Cosby. 0850- Magnesium stopped. 1330- BRAXTON Becerra made aware of Bps. Verbal order given to hold 1400 of Labetalol. [address] and this RN at bedside. 100mL of fluid removed from Bakri balloon. Pt tolerated well and bleeding stable. [address] and this RN at bedside. An additional 150mL of fluid removed from and the Bakri balloon removed. Pt tolerated procedure well and bleeding remained stable. 1800- Pt resting. Lanza removed and Pt ambulated to restroom w/assistance. 1945- TRANSFER - OUT REPORT:    Verbal report given to S. Coral Nyhan RN (name) on [facility]  being transferred to Mother Baby (unit) for routine progression of care       Report consisted of patients Situation, Background, Assessment and   Recommendations(SBAR). Information from the following report(s) SBAR, OR Summary, Procedure Summary, Intake/Output, MAR, Recent Results, and Med Rec Status was reviewed with the receiving nurse. Lines:   Peripheral IV 02/06/23 Posterior;Right Hand (Active)        Opportunity for questions and clarification was provided.       Patient transported with:   Registered Nurse

## 2023-02-08 NOTE — PROGRESS NOTES
1110- pt stated she was feeling some gushes, underpad check moderate amount of blood on pad with some clots present. Pad weighed, 165g noted. 0130- Notified FELIX Rios of bleeding and QBL, pt alert and denies chest pain, dizziness, or feeling light headed,  BP and HR WDL. Intervention per FELIX Fraser, continue to monitor bleeding    0210- Mag level 6.6. Per FELIX Stokes, decrease infusion from 2g to 1g    0214- Infusion decreased    0235 Called JAI Rios to bedside to assess patient, patient with continued bleeding and now hypotensive and lethargic. Bolus started, pt elevate    0237- Midwife at bedside to assess, pt clots noted to be present and funds at +1.      0253 All clots were unable to be expressed manually, Dr. Shant Pak called     Ananth Peterson stopped, per verbal order from Dr. Shant Pak    4848- Pt taken to OR with RN and Anesthesia, Report given to [address] [place] [address]- Per Hood Zaman, BRAXTON ok for patient to be on a regular diet    0545- Pt arrived back to L&D unit with [facility] in place pt alert and oriented, talking and vitals are WDL per report EBL 200ml. Mag re started at 1g per verbal order from FELIX Stokes    0730- report given to MOUSTAPHA Jarrett RN, all details explained. Report included SBAR, MAR, and recent procedures.

## 2023-02-08 NOTE — PROGRESS NOTES
100mls of yan fluid removed at 1615. Bleeding remained stable. Remaining fluid removed at 1700. Bleeding stable.     PATEL Tang, BRAXTON  February 8, 2023

## 2023-02-09 LAB
BACTERIA SPEC CULT: NORMAL
SERVICE CMNT-IMP: NORMAL

## 2023-02-09 PROCEDURE — 74011250637 HC RX REV CODE- 250/637: Performed by: OBSTETRICS & GYNECOLOGY

## 2023-02-09 PROCEDURE — 74011250636 HC RX REV CODE- 250/636: Performed by: ADVANCED PRACTICE MIDWIFE

## 2023-02-09 PROCEDURE — 74011250637 HC RX REV CODE- 250/637: Performed by: ADVANCED PRACTICE MIDWIFE

## 2023-02-09 PROCEDURE — 65270000029 HC RM PRIVATE

## 2023-02-09 PROCEDURE — 74011250637 HC RX REV CODE- 250/637

## 2023-02-09 PROCEDURE — 76060000078 HC EPIDURAL ANESTHESIA

## 2023-02-09 RX ORDER — IBUPROFEN 800 MG/1
800 TABLET ORAL
Qty: 90 TABLET | Refills: 0 | Status: SHIPPED | OUTPATIENT
Start: 2023-02-10

## 2023-02-09 RX ORDER — LANOLIN ALCOHOL/MO/W.PET/CERES
1 CREAM (GRAM) TOPICAL
Status: DISCONTINUED | OUTPATIENT
Start: 2023-02-09 | End: 2023-02-10 | Stop reason: HOSPADM

## 2023-02-09 RX ORDER — NIFEDIPINE 30 MG/1
30 TABLET, EXTENDED RELEASE ORAL DAILY
Qty: 30 TABLET | Refills: 1 | Status: SHIPPED | OUTPATIENT
Start: 2023-02-10

## 2023-02-09 RX ORDER — IBUPROFEN 400 MG/1
800 TABLET ORAL EVERY 8 HOURS
Status: DISCONTINUED | OUTPATIENT
Start: 2023-02-09 | End: 2023-02-10 | Stop reason: HOSPADM

## 2023-02-09 RX ORDER — OXYCODONE AND ACETAMINOPHEN 5; 325 MG/1; MG/1
1 TABLET ORAL
Qty: 20 TABLET | Refills: 0 | Status: SHIPPED | OUTPATIENT
Start: 2023-02-09 | End: 2023-02-16

## 2023-02-09 RX ADMIN — IBUPROFEN 800 MG: 400 TABLET, FILM COATED ORAL at 22:22

## 2023-02-09 RX ADMIN — KETOROLAC TROMETHAMINE 30 MG: 30 INJECTION, SOLUTION INTRAMUSCULAR at 06:42

## 2023-02-09 RX ADMIN — IBUPROFEN 800 MG: 400 TABLET, FILM COATED ORAL at 14:28

## 2023-02-09 RX ADMIN — FERROUS SULFATE TAB 325 MG (65 MG ELEMENTAL FE) 325 MG: 325 (65 FE) TAB at 10:33

## 2023-02-09 RX ADMIN — NIFEDIPINE 30 MG: 30 TABLET, EXTENDED RELEASE ORAL at 09:40

## 2023-02-09 RX ADMIN — LABETALOL HYDROCHLORIDE 200 MG: 200 TABLET, FILM COATED ORAL at 19:52

## 2023-02-09 RX ADMIN — KETOROLAC TROMETHAMINE 30 MG: 30 INJECTION, SOLUTION INTRAMUSCULAR at 00:34

## 2023-02-09 RX ADMIN — LABETALOL HYDROCHLORIDE 200 MG: 200 TABLET, FILM COATED ORAL at 09:40

## 2023-02-09 RX ADMIN — LABETALOL HYDROCHLORIDE 200 MG: 200 TABLET, FILM COATED ORAL at 14:28

## 2023-02-09 NOTE — DISCHARGE SUMMARY
Obstetrical Discharge Summary     Name: Jas Robbins MRN: [id number]  SSN: [id number]    YOB: 1995  Age: 24 y.o. Sex: female      Allergies: Topiramate    Admit Date: 2023    Discharge Date: 2/10/2023    Admitting Physician: Iza Moise MD     Attending Physician:  Aaron Garcia MD     * Admission Diagnoses: IUP (intrauterine pregnancy), incidental [Z33.1]  34 weeks gestation of pregnancy [Z3A.34]  Gestational hypertension, third trimester [O13.3]  Preeclampsia, severe, third trimester [O14.13]  Normal IUP (intrauterine pregnancy) on prenatal ultrasound, third trimester [Z34.93]    * Discharge Diagnoses:   Information for the patient's :  Carolyn Carranza [[id number]]   Delivery of a 1.73 kg female infant via , Low Transverse on 2023 at 4:14 PM  by Iza Moise. Apgars were 2  and 5 . Additional Diagnoses:   Hospital Problems as of 2023 Date Reviewed: 2023            Codes Class Noted - Resolved POA     delivery delivered ICD-10-CM: O82  ICD-9-CM: 669.71  2023 - Present No        IUP (intrauterine pregnancy), incidental ICD-10-CM: Z33.1  ICD-9-CM: V22.2  2023 - Present Yes        34 weeks gestation of pregnancy ICD-10-CM: Z3A.34  ICD-9-CM: V22.2  2023 - Present Yes        Gestational hypertension, third trimester ICD-10-CM: O13.3  ICD-9-CM: 642.33  2023 - Present Yes        Preeclampsia, severe, third trimester ICD-10-CM: O14.13  ICD-9-CM: 642.53  2023 - Present Yes        Normal IUP (intrauterine pregnancy) on prenatal ultrasound, third trimester ICD-10-CM: Z34.93  ICD-9-CM: V22.2  2023 - Present Yes          Lab Results   Component Value Date/Time    ABO/Rh(D) B POSITIVE 2023 12:05 PM    There is no immunization history for the selected administration types on file for this patient.       Procedure(s):  DILATATION AND CURETTAGE WITH ULTRASOUND GUIDANCE AND BARKRI PLACEMENT           * Discharge Condition: [facility] Course: Normal hospital course following the delivery. * Disposition: Home    Discharge Medications:   Current Discharge Medication List        START taking these medications    Details   ibuprofen (MOTRIN) 800 mg tablet Take 1 Tablet by mouth every eight (8) hours as needed for Pain. Qty: 90 Tablet, Refills: 0  Start date: 2/10/2023      NIFEdipine ER (PROCARDIA XL) 30 mg ER tablet Take 1 Tablet by mouth daily. Qty: 30 Tablet, Refills: 1  Start date: 2/10/2023      oxyCODONE-acetaminophen (PERCOCET) 5-325 mg per tablet Take 1 Tablet by mouth every six (6) hours as needed for Pain for up to 7 days. Max Daily Amount: 4 Tablets. Qty: 20 Tablet, Refills: 0  Start date: 2023, End date: 2023    Associated Diagnoses:  delivery delivered           CONTINUE these medications which have NOT CHANGED    Details   prenatal vit/iron fum/folic ac (PRENATAL 1+1 PO) Take 1 Tablet by mouth daily. ondansetron hcl (ZOFRAN) 8 mg tablet Take 8 mg by mouth every eight (8) hours as needed for Nausea or Vomiting. labetaloL (NORMODYNE) 200 mg tablet Take 1 Tablet by mouth two (2) times a day. Qty: 60 Tablet, Refills: 2             * Follow-up Care/Patient Instructions:   Activity: No lifting, Driving, sex, or Strenuous exercise for 6 weeks  Diet: Regular Diet  Wound Care: Keep wound clean and dry  Follow up in 1 week for BP check  Follow up in office in 6 weeks for routine postpartum visit          Sonia Kaur CNM

## 2023-02-09 NOTE — PROGRESS NOTES
Progress Note                               Patient: Wilbert Enrique MRN: [id number]  SSN: [id number]    YOB: 1995  Age: 24 y.o. Sex: female      1 Day Post-Op     Subjective:     Voiding without difficulty. Patient denies headache. Denies nausea/vomiting. Is passing flatus. Has not had bowel movement. Denies visual changes. Denies shortness of breath. Patient reports normal lochia. . Breastfeeding: Yes     Objective:     Patient Vitals for the past 18 hrs:   Temp Pulse Resp BP SpO2   23 1034 -- 95 -- 117/67 --   23 0831 98.9 °F (37.2 °C) 94 18 128/67 100 %   23 0647 99.5 °F (37.5 °C) (!) 105 18 127/78 99 %   23 0034 99.2 °F (37.3 °C) 98 18 121/67 99 %   23 2007 99.4 °F (37.4 °C) (!) 104 18 128/70 99 %       Temp (24hrs), Av.3 °F (37.4 °C), Min:98.9 °F (37.2 °C), Max:99.7 °F (37.6 °C)      Physical Exam:    General:   Patient without distress. Abdomen: Soft, fundus firm at level of umbilicus, nontender   Incision: Clean, dry, and intact without erythema   Lower Extremities: Negative for swelling, cords, or tenderness        Lab/Data Review: All lab results for the last 24 hours reviewed. Assessment and Plan:     Patient appears to be having an uncomplicated post- course. Continue routine postoperative care and maternal education. Possible discharge home tomorrow.          Osman Ackerman CNM

## 2023-02-09 NOTE — PROGRESS NOTES
0720 Bedside and Verbal shift change report given to KAREN Turner RN (oncoming nurse) by Fatimah Gallegos RN (offgoing nurse). Report included the following information SBAR, Kardex, OR Summary, Procedure Summary, Intake/Output, MAR, and Recent Results. 1910 Bedside and Verbal shift change report given to TAYLOR Gallegos RN (oncoming nurse) by Ingris Marroquin RN (offgoing nurse). Report included the following information SBAR, Kardex, OR Summary, Procedure Summary, Intake/Output, MAR, and Recent Results.

## 2023-02-09 NOTE — PROGRESS NOTES
Problem: Hypertension  Goal: *Blood pressure within specified parameters  Outcome: Progressing Towards Goal  Goal: *Fluid volume balance  Outcome: Progressing Towards Goal  Goal: *Labs within defined limits  Outcome: Progressing Towards Goal     Problem: Patient Education: Go to Patient Education Activity  Goal: Patient/Family Education  Outcome: Progressing Towards Goal     Problem: Vaginal Delivery: Day of Deliver-Laboring  Goal: Off Pathway (Use only if patient is Off Pathway)  Outcome: Progressing Towards Goal  Goal: Activity/Safety  Outcome: Progressing Towards Goal  Goal: Consults, if ordered  Outcome: Progressing Towards Goal  Goal: Diagnostic Test/Procedures  Outcome: Progressing Towards Goal  Goal: Nutrition/Diet  Outcome: Progressing Towards Goal  Goal: Discharge Planning  Outcome: Progressing Towards Goal  Goal: Medications  Outcome: Progressing Towards Goal  Goal: Respiratory  Outcome: Progressing Towards Goal  Goal: Treatments/Interventions/Procedures  Outcome: Progressing Towards Goal  Goal: *Vital signs within defined limits  Outcome: Progressing Towards Goal  Goal: *Labs within defined limits  Outcome: Progressing Towards Goal  Goal: *Hemodynamically stable  Outcome: Progressing Towards Goal  Goal: *Optimal pain control at patient's stated goal  Outcome: Progressing Towards Goal     Problem: Falls - Risk of  Goal: *Absence of Falls  Description: Document Radha Fall Risk and appropriate interventions in the flowsheet.   Outcome: Progressing Towards Goal  Note: Fall Risk Interventions:            Medication Interventions: Teach patient to arise slowly                   Problem: Patient Education: Go to Patient Education Activity  Goal: Patient/Family Education  Outcome: Progressing Towards Goal     Problem: Pain  Goal: *Control of Pain  Outcome: Progressing Towards Goal

## 2023-02-09 NOTE — DISCHARGE INSTRUCTIONS
POST DELIVERY DISCHARGE INSTRUCTIONS    Name: Donita Ndiaye  YOB: 1995  Primary Diagnosis: Active Problems:    IUP (intrauterine pregnancy), incidental (2023)      34 weeks gestation of pregnancy (2023)      Gestational hypertension, third trimester (2023)      Preeclampsia, severe, third trimester (2023)      Normal IUP (intrauterine pregnancy) on prenatal ultrasound, third trimester (2023)       delivery delivered (2023)        General:     Diet/Diet Restrictions:  Eight 8-ounce glasses of fluid daily (water, juices); avoid excessive caffeine intake. Meals/snacks as desired which are high in fiber and carbohydrates and low in fat and cholesterol. Physical Activity / Restrictions / Safety:     Avoid heavy lifting, no more that 8 lbs. For 2-3 weeks; limit use of stairs to 2 times daily for the first week home. No driving for one week. Avoid intercourse 4-6 weeks, no douching or tampon use. Check with obstetrician before starting or resuming an exercise program.       Discharge Instructions/Special Treatment/Home Care Needs:     Continue prenatal vitamins. Continue to use squirt bottle with warm water on your perineum after each bathroom use until bleeding stops. Call your doctor for the following:     Fever over 100.4 degrees by mouth. Vaginal bleeding heavier than a normal menstrual period or clot larger than a golf ball. Red streaks or increased swelling of legs, painful red streaks on your breast.  Painful urination, constipation and increased pain or swelling or discharge with your incision. If you feel extremely anxious or overwhelmed. If you have thoughts of harming yourself and/or your baby. Pain Management:     Pain Management:   Take Acetaminophen (Tylenol) or Ibuprofen (Advil, Motrin), as directed for pain. Use a warm Sitz bath 3 times daily to relieve episiotomy or hemorrhoidal discomfort. Heating pad to  incision as needed.  For hemorrhoidal discomfort, use Tucks and Anusol cream as needed and directed. Depression After Childbirth: Care Instructions  It's common to lose sleep, feel irritable, and cry easily during the first few days after childbirth. Hormone changes and the demands of a new baby can cause these \"baby blues. \" If these mood changes last more than 2 weeks, you may have postpartum depression. This is a medical condition that requires treatment. If you have any of these signs, you may be depressed. See your doctor right away. You feel very sad or hopeless and lose interest in daily activities. You sleep too much or not enough. You feel tired or as if you have no energy. You eat too much or too little. You write or talk about death. Where to get help 24 hours a day, 7 days a week   If you or someone you know talks about suicide, self-harm, a mental health crisis, a substance use crisis, or any other kind of emotional distress, get help right away. You can:   Call the Suicide and Crisis Lifeline at 65. Call 4-189-625-IMNC (). 9-261.127.3784    Text HOME to 557606 to access the Crisis Text Line. Consider saving these numbers in your phone. What you can do   Try to go to all of your counseling sessions. Take medicines as directed. Eat healthy foods. Get daily exercise, such as walks. Try to get some sunlight every day. Avoid using alcohol or other substances. Get as much rest as possible. Connect with friends, and join a support group for new parents. When should you call for help? Call 090 if: You feel you cannot stop from hurting yourself, your baby, or someone else. Call your doctor now or seek immediate medical care if:    You are having trouble caring for yourself or your baby. You hear voices. Contact your doctor if:    You have problems with your medicines. You do not get better as expected. Follow-up care is a key part of your treatment and safety.  Be sure to make and go to all appointments, and call your doctor if you are having problems. It's also a good idea to know your test results and keep a list of the medicines you take. Where can you learn more? Go to http://www.madrigal.com/  Enter [id number] in the search box to learn more about \"Depression After Childbirth: Care Instructions. \"  Current as of: February 9, 2022               Content Version: 13.4  © 2006-2022 [employer]. Care instructions adapted under license by [employer] (which disclaims liability or warranty for this information). If you have questions about a medical condition or this instruction, always ask your healthcare professional. [address] any warranty or liability for your use of this information.

## 2023-02-09 NOTE — PROGRESS NOTES
Problem: Hypertension  Goal: *Blood pressure within specified parameters  Outcome: Progressing Towards Goal  Goal: *Fluid volume balance  Outcome: Progressing Towards Goal  Goal: *Labs within defined limits  Outcome: Progressing Towards Goal     Problem: Patient Education: Go to Patient Education Activity  Goal: Patient/Family Education  Outcome: Progressing Towards Goal     Problem: Pain  Goal: *Control of Pain  Outcome: Progressing Towards Goal     Problem:  Delivery: Postpartum Day 2  Goal: Activity/Safety  Outcome: Progressing Towards Goal  Goal: Nutrition/Diet  Outcome: Progressing Towards Goal  Goal: Discharge Planning  Outcome: Progressing Towards Goal  Goal: Medications  Outcome: Progressing Towards Goal  Goal: Treatments/Interventions/Procedures  Outcome: Progressing Towards Goal  Goal: Psychosocial  Outcome: Progressing Towards Goal  Goal: *Vital signs within defined limits  Outcome: Progressing Towards Goal  Goal: *Labs within defined limits  Outcome: Progressing Towards Goal  Goal: *Hemodynamically stable  Outcome: Progressing Towards Goal  Goal: *Optimal pain control at patient's stated goal  Outcome: Progressing Towards Goal  Goal: *Participates in infant care  Outcome: Progressing Towards Goal  Goal: *Demonstrates progressive activity  Outcome: Progressing Towards Goal  Goal: *Appropriate parent-infant bonding  Outcome: Progressing Towards Goal  Goal: *Tolerating diet  Outcome: Progressing Towards Goal

## 2023-02-10 VITALS
DIASTOLIC BLOOD PRESSURE: 81 MMHG | OXYGEN SATURATION: 97 % | WEIGHT: 208 LBS | HEART RATE: 89 BPM | BODY MASS INDEX: 41.93 KG/M2 | RESPIRATION RATE: 18 BRPM | SYSTOLIC BLOOD PRESSURE: 123 MMHG | HEIGHT: 59 IN | TEMPERATURE: 98.8 F

## 2023-02-10 LAB
ABO + RH BLD: NORMAL
BLD PROD TYP BPU: NORMAL
BLOOD GROUP ANTIBODIES SERPL: NORMAL
BPU ID: NORMAL
CALLED TO:,BCALL1: NORMAL
CROSSMATCH RESULT,%XM: NORMAL
SPECIMEN EXP DATE BLD: NORMAL
STATUS OF UNIT,%ST: NORMAL
UNIT DIVISION, %UDIV: 0

## 2023-02-10 PROCEDURE — 74011250637 HC RX REV CODE- 250/637: Performed by: ADVANCED PRACTICE MIDWIFE

## 2023-02-10 PROCEDURE — 74011250637 HC RX REV CODE- 250/637

## 2023-02-10 PROCEDURE — 74011250637 HC RX REV CODE- 250/637: Performed by: OBSTETRICS & GYNECOLOGY

## 2023-02-10 RX ADMIN — FERROUS SULFATE TAB 325 MG (65 MG ELEMENTAL FE) 325 MG: 325 (65 FE) TAB at 07:39

## 2023-02-10 RX ADMIN — IBUPROFEN 800 MG: 400 TABLET, FILM COATED ORAL at 07:38

## 2023-02-10 RX ADMIN — NIFEDIPINE 30 MG: 30 TABLET, EXTENDED RELEASE ORAL at 09:33

## 2023-02-10 RX ADMIN — LABETALOL HYDROCHLORIDE 200 MG: 200 TABLET, FILM COATED ORAL at 07:39

## 2023-02-10 NOTE — PROGRESS NOTES
0730: Bedside and verbal shift change report given to KAREN Braga RN  by TAYLOR Hernandez RN . Assumed care of pt at this time.

## 2023-02-10 NOTE — LACTATION NOTE
Per mom, has been q3 pumping and \"starting to get a few tiny drops\". Encouraged increased rest and hydration. Will remain available.

## 2023-02-10 NOTE — PROGRESS NOTES
Problem: Hypertension  Goal: *Blood pressure within specified parameters  Outcome: Progressing Towards Goal  Goal: *Fluid volume balance  Outcome: Progressing Towards Goal  Goal: *Labs within defined limits  Outcome: Progressing Towards Goal     Problem: Patient Education: Go to Patient Education Activity  Goal: Patient/Family Education  Outcome: Progressing Towards Goal     Problem: Vaginal Delivery: Day of Deliver-Laboring  Goal: Off Pathway (Use only if patient is Off Pathway)  Outcome: Progressing Towards Goal  Goal: Activity/Safety  Outcome: Progressing Towards Goal  Goal: Consults, if ordered  Outcome: Progressing Towards Goal  Goal: Diagnostic Test/Procedures  Outcome: Progressing Towards Goal  Goal: Nutrition/Diet  Outcome: Progressing Towards Goal  Goal: Discharge Planning  Outcome: Progressing Towards Goal  Goal: Medications  Outcome: Progressing Towards Goal  Goal: Respiratory  Outcome: Progressing Towards Goal  Goal: Treatments/Interventions/Procedures  Outcome: Progressing Towards Goal  Goal: *Vital signs within defined limits  Outcome: Progressing Towards Goal  Goal: *Labs within defined limits  Outcome: Progressing Towards Goal  Goal: *Hemodynamically stable  Outcome: Progressing Towards Goal  Goal: *Optimal pain control at patient's stated goal  Outcome: Progressing Towards Goal     Problem: Falls - Risk of  Goal: *Absence of Falls  Description: Document Radha Fall Risk and appropriate interventions in the flowsheet.   Outcome: Progressing Towards Goal  Note: Fall Risk Interventions:            Medication Interventions: Teach patient to arise slowly                   Problem: Patient Education: Go to Patient Education Activity  Goal: Patient/Family Education  Outcome: Progressing Towards Goal     Problem: Pain  Goal: *Control of Pain  Outcome: Progressing Towards Goal     Problem:  Delivery: Postpartum Day 2  Goal: Activity/Safety  Outcome: Progressing Towards Goal  Goal: Consults, if ordered  Outcome: Progressing Towards Goal  Goal: Nutrition/Diet  Outcome: Progressing Towards Goal  Goal: Discharge Planning  Outcome: Progressing Towards Goal  Goal: Medications  Outcome: Progressing Towards Goal  Goal: Treatments/Interventions/Procedures  Outcome: Progressing Towards Goal  Goal: Psychosocial  Outcome: Progressing Towards Goal  Goal: *Vital signs within defined limits  Outcome: Progressing Towards Goal  Goal: *Labs within defined limits  Outcome: Progressing Towards Goal  Goal: *Hemodynamically stable  Outcome: Progressing Towards Goal  Goal: *Optimal pain control at patient's stated goal  Outcome: Progressing Towards Goal  Goal: *Participates in infant care  Outcome: Progressing Towards Goal  Goal: *Demonstrates progressive activity  Outcome: Progressing Towards Goal  Goal: *Appropriate parent-infant bonding  Outcome: Progressing Towards Goal  Goal: *Tolerating diet  Outcome: Progressing Towards Goal

## 2023-02-11 NOTE — PROGRESS NOTES
Problem: Hypertension  Goal: *Blood pressure within specified parameters  2/10/2023 2013 by Fiorella Qureshi, RN  Outcome: Resolved/Met  2/10/2023 0956 by Fiorella Qureshi, RN  Outcome: Progressing Towards Goal  Goal: *Fluid volume balance  2/10/2023 2013 by Fiorella Qureshi, RN  Outcome: Resolved/Met  2/10/2023 0956 by Fiorella Qureshi, RN  Outcome: Progressing Towards Goal  Goal: *Labs within defined limits  2/10/2023 2013 by Fiorella Qureshi, RN  Outcome: Resolved/Met  2/10/2023 0956 by Fiorella Qureshi RN  Outcome: Progressing Towards Goal     Problem: Patient Education: Go to Patient Education Activity  Goal: Patient/Family Education  2/10/2023 2013 by Fiorella Qureshi, RN  Outcome: Resolved/Met  2/10/2023 0956 by Fiorella Qureshi RN  Outcome: Progressing Towards Goal     Problem: Vaginal Delivery: Day of Deliver-Laboring  Goal: Off Pathway (Use only if patient is Off Pathway)  2/10/2023 2013 by Fiorella Qureshi, RN  Outcome: Resolved/Met  2/10/2023 0956 by Fiorella Qureshi RN  Outcome: Progressing Towards Goal  Goal: Activity/Safety  2/10/2023 2013 by Fiorella Qureshi, RN  Outcome: Resolved/Met  2/10/2023 0956 by Fiorella Qureshi RN  Outcome: Progressing Towards Goal  Goal: Consults, if ordered  2/10/2023 2013 by Fiorella Qureshi, RN  Outcome: Resolved/Met  2/10/2023 0956 by Fiorella Qureshi, RN  Outcome: Progressing Towards Goal  Goal: Diagnostic Test/Procedures  2/10/2023 2013 by Fiorella Qureshi, RN  Outcome: Resolved/Met  2/10/2023 0956 by Fiorella Qureshi, RN  Outcome: Progressing Towards Goal  Goal: Nutrition/Diet  2/10/2023 2013 by Fiorella Qureshi, RN  Outcome: Resolved/Met  2/10/2023 0956 by Fiorella Qureshi, RN  Outcome: Progressing Towards Goal  Goal: Discharge Planning  2/10/2023 2013 by Fiorella Qureshi, RN  Outcome: Resolved/Met  2/10/2023 0956 by Fiorella Qureshi, RN  Outcome: Progressing Towards Goal  Goal: Medications  2/10/2023 2013 by Fiorella Qureshi, RN  Outcome: Resolved/Met  2/10/2023 0956 by Janelle Redding [place] RN  Outcome: Progressing Towards Goal  Goal: Respiratory  2/10/2023 2013 by Ac Frias RN  Outcome: Resolved/Met  2/10/2023 0956 by Ac Frias RN  Outcome: Progressing Towards Goal  Goal: Treatments/Interventions/Procedures  2/10/2023 2013 by Ac Frias RN  Outcome: Resolved/Met  2/10/2023 0956 by Ac Frias RN  Outcome: Progressing Towards Goal  Goal: *Vital signs within defined limits  2/10/2023 2013 by Ac Frias RN  Outcome: Resolved/Met  2/10/2023 0956 by Ac Frias RN  Outcome: Progressing Towards Goal  Goal: *Labs within defined limits  2/10/2023 2013 by Ac Frias RN  Outcome: Resolved/Met  2/10/2023 0956 by Ac Frias RN  Outcome: Progressing Towards Goal  Goal: *Hemodynamically stable  2/10/2023 2013 by Ac Frias RN  Outcome: Resolved/Met  2/10/2023 09 by Ac Frias RN  Outcome: Progressing Towards Goal  Goal: *Optimal pain control at patient's stated goal  2/10/2023 2013 by Ac Frias RN  Outcome: Resolved/Met  2/10/2023 0956 by Ac Frias RN  Outcome: Progressing Towards Goal     Problem: Falls - Risk of  Goal: *Absence of Falls  Description: Document Radha Recinos Fall Risk and appropriate interventions in the flowsheet.   2/10/2023 2013 by Ac Frias RN  Outcome: Resolved/Met  2/10/2023 0956 by Ac Frias RN  Outcome: Progressing Towards Goal  Note: Fall Risk Interventions:            Medication Interventions: Teach patient to arise slowly                   Problem: Patient Education: Go to Patient Education Activity  Goal: Patient/Family Education  2/10/2023 2013 by Ac Frias RN  Outcome: Resolved/Met  2/10/2023 0956 by Ac Frias RN  Outcome: Progressing Towards Goal     Problem: Pain  Goal: *Control of Pain  2/10/2023 2013 by Ac Frias RN  Outcome: Resolved/Met  2/10/2023 09 by Ac Frias RN  Outcome: Progressing Towards Goal     Problem:  Delivery: Postpartum Day 2  Goal: Activity/Safety  2/10/2023 2013 by Stephen Aleman RN  Outcome: Resolved/Met  2/10/2023 0956 by Stephen Aleman RN  Outcome: Progressing Towards Goal  Goal: Consults, if ordered  2/10/2023 2013 by Stephen Aleman, RN  Outcome: Resolved/Met  2/10/2023 0956 by Stephen Aleman RN  Outcome: Progressing Towards Goal  Goal: Nutrition/Diet  2/10/2023 2013 by Stephen Aleman, RN  Outcome: Resolved/Met  2/10/2023 0956 by Stephen Aleman RN  Outcome: Progressing Towards Goal  Goal: Discharge Planning  2/10/2023 2013 by Stephen Aleman, RN  Outcome: Resolved/Met  2/10/2023 0956 by Stephen Aleman RN  Outcome: Progressing Towards Goal  Goal: Medications  2/10/2023 2013 by Stephen Aleman RN  Outcome: Resolved/Met  2/10/2023 0956 by Stephen Aleman RN  Outcome: Progressing Towards Goal  Goal: Treatments/Interventions/Procedures  2/10/2023 2013 by Stephen Aleman RN  Outcome: Resolved/Met  2/10/2023 0956 by Stephen Aleman RN  Outcome: Progressing Towards Goal  Goal: Psychosocial  2/10/2023 2013 by Stephen Aleman RN  Outcome: Resolved/Met  2/10/2023 0956 by Stephen Aleman RN  Outcome: Progressing Towards Goal  Goal: *Vital signs within defined limits  2/10/2023 2013 by Stephen Aleman RN  Outcome: Resolved/Met  2/10/2023 0956 by Stephen Aleman RN  Outcome: Progressing Towards Goal  Goal: *Labs within defined limits  2/10/2023 2013 by Stephen Aleman RN  Outcome: Resolved/Met  2/10/2023 0956 by Stephen Aleman RN  Outcome: Progressing Towards Goal  Goal: *Hemodynamically stable  2/10/2023 2013 by Stephen Aleman, RN  Outcome: Resolved/Met  2/10/2023 0956 by Stephen Aleman RN  Outcome: Progressing Towards Goal  Goal: *Optimal pain control at patient's stated goal  2/10/2023 2013 by Stephen Aleman RN  Outcome: Resolved/Met  2/10/2023 0956 by Stephen Aleman RN  Outcome: Progressing Towards Goal  Goal: *Participates in infant care  2/10/2023 2013 by Stephen Aleman RN  Outcome: Resolved/Met  2/10/2023 0956 by Vinita Awad RN  Outcome: Progressing Towards Goal  Goal: *Demonstrates progressive activity  2/10/2023 2013 by Vinita Awad RN  Outcome: Resolved/Met  2/10/2023 0956 by Vinita Awad RN  Outcome: Progressing Towards Goal  Goal: *Appropriate parent-infant bonding  2/10/2023 2013 by Vinita Awad RN  Outcome: Resolved/Met  2/10/2023 0956 by Vinita Awad RN  Outcome: Progressing Towards Goal  Goal: *Tolerating diet  2/10/2023 2013 by Vinita Awad RN  Outcome: Resolved/Met  2/10/2023 0956 by Vinita Awad RN  Outcome: Progressing Towards Goal

## (undated) DEVICE — SUTURE VCRL + SZ 2-0 L36IN ABSRB UD L36MM CT-1 1/2 CIR VCP945H

## (undated) DEVICE — STAPLER SKIN ABSORBABLE 30 STRL INSORB

## (undated) DEVICE — SOL IRR SOD CL 0.9% PF 500ML -- AQUALITE

## (undated) DEVICE — SUTURE VCRL SZ 0 L36IN ABSRB UD L36MM CT-1 1/2 CIR J946H

## (undated) DEVICE — D&C HYSTEROSCOPY: Brand: MEDLINE INDUSTRIES, INC.

## (undated) DEVICE — BAKRI POSTPARTUM BALLOON WITH RAPID INSTILLATION COMPONENTS: Brand: BAKRI

## (undated) DEVICE — GLOVE SURG SZ 65 THK91MIL LTX FREE SYN POLYISOPRENE

## (undated) DEVICE — INTENDED FOR TISSUE SEPARATION, AND OTHER PROCEDURES THAT REQUIRE A SHARP SURGICAL BLADE TO PUNCTURE OR CUT.: Brand: BARD-PARKER ® CARBON RIB-BACK BLADES

## (undated) DEVICE — REM POLYHESIVE ADULT PATIENT RETURN ELECTRODE: Brand: VALLEYLAB

## (undated) DEVICE — DERMABOND SKIN ADH 0.7ML --

## (undated) DEVICE — SUTURE MCRYL + SZ 4-0 L27IN ABSRB UD L19MM PS-2 3/8 CIR MCP426H

## (undated) DEVICE — GOWN,AURORA,NONRNF,XL,30/CS: Brand: MEDLINE

## (undated) DEVICE — CATHETER,URETHRAL,REDRUBBER,STRL,16FR: Brand: MEDLINE

## (undated) DEVICE — TUBE SUC UTER ASPIR 3/8INX6FT --

## (undated) DEVICE — STRAP,POSITIONING,KNEE/BODY,FOAM,4X60": Brand: MEDLINE

## (undated) DEVICE — SYSTEM COLL W/ TISS TRAP INCLUDE COLL CANSTR LID SET OF

## (undated) DEVICE — BSMI CSECTION BIRTHING PACK: Brand: MEDLINE INDUSTRIES, INC.

## (undated) DEVICE — GARMENT,MEDLINE,DVT,INT,CALF,MED, GEN2: Brand: MEDLINE

## (undated) DEVICE — LINER,SEMI-RIGID,3000CC,50EA/CS: Brand: MEDLINE

## (undated) DEVICE — HEX-LOCKING BLADE ELECTRODE: Brand: EDGE